# Patient Record
Sex: FEMALE | Race: WHITE | Employment: OTHER | URBAN - METROPOLITAN AREA
[De-identification: names, ages, dates, MRNs, and addresses within clinical notes are randomized per-mention and may not be internally consistent; named-entity substitution may affect disease eponyms.]

---

## 2017-12-18 PROBLEM — R00.2 PALPITATIONS: Status: ACTIVE | Noted: 2017-12-18

## 2017-12-18 PROBLEM — Z92.89 HISTORY OF ECHOCARDIOGRAM: Status: ACTIVE | Noted: 2017-12-18

## 2019-02-12 ENCOUNTER — OFFICE VISIT (OUTPATIENT)
Dept: CARDIOLOGY CLINIC | Age: 64
End: 2019-02-12
Payer: COMMERCIAL

## 2019-02-12 VITALS
WEIGHT: 191 LBS | BODY MASS INDEX: 36.06 KG/M2 | DIASTOLIC BLOOD PRESSURE: 72 MMHG | SYSTOLIC BLOOD PRESSURE: 124 MMHG | HEART RATE: 56 BPM | RESPIRATION RATE: 16 BRPM | HEIGHT: 61 IN

## 2019-02-12 DIAGNOSIS — R00.2 PALPITATIONS: Primary | ICD-10-CM

## 2019-02-12 PROCEDURE — 93000 ELECTROCARDIOGRAM COMPLETE: CPT | Performed by: INTERNAL MEDICINE

## 2019-02-12 PROCEDURE — 99213 OFFICE O/P EST LOW 20 MIN: CPT | Performed by: INTERNAL MEDICINE

## 2021-09-21 PROBLEM — G47.30 SLEEP APNEA: Status: ACTIVE | Noted: 2018-04-17

## 2021-09-21 PROBLEM — G43.909 MIGRAINE: Status: ACTIVE | Noted: 2021-09-21

## 2021-09-21 PROBLEM — K21.9 GASTROESOPHAGEAL REFLUX DISEASE: Status: ACTIVE | Noted: 2021-09-21

## 2022-10-01 ENCOUNTER — HOSPITAL ENCOUNTER (EMERGENCY)
Age: 67
Discharge: HOME | End: 2022-10-01
Attending: EMERGENCY MEDICINE
Payer: MEDICARE

## 2022-10-01 ENCOUNTER — APPOINTMENT (OUTPATIENT)
Dept: GENERAL RADIOLOGY | Age: 67
End: 2022-10-01
Payer: MEDICARE

## 2022-10-01 ENCOUNTER — APPOINTMENT (OUTPATIENT)
Dept: CT IMAGING | Age: 67
End: 2022-10-01
Payer: MEDICARE

## 2022-10-01 VITALS
SYSTOLIC BLOOD PRESSURE: 122 MMHG | RESPIRATION RATE: 14 BRPM | BODY MASS INDEX: 41.43 KG/M2 | HEART RATE: 68 BPM | WEIGHT: 211 LBS | TEMPERATURE: 96.3 F | DIASTOLIC BLOOD PRESSURE: 61 MMHG | OXYGEN SATURATION: 100 % | HEIGHT: 60 IN

## 2022-10-01 DIAGNOSIS — S52.501A CLOSED FRACTURE OF DISTAL END OF RIGHT RADIUS, UNSPECIFIED FRACTURE MORPHOLOGY, INITIAL ENCOUNTER: Primary | ICD-10-CM

## 2022-10-01 PROCEDURE — 99284 EMERGENCY DEPT VISIT MOD MDM: CPT

## 2022-10-01 PROCEDURE — 72125 CT NECK SPINE W/O DYE: CPT

## 2022-10-01 PROCEDURE — 90714 TD VACC NO PRESV 7 YRS+ IM: CPT | Performed by: STUDENT IN AN ORGANIZED HEALTH CARE EDUCATION/TRAINING PROGRAM

## 2022-10-01 PROCEDURE — 6360000002 HC RX W HCPCS: Performed by: STUDENT IN AN ORGANIZED HEALTH CARE EDUCATION/TRAINING PROGRAM

## 2022-10-01 PROCEDURE — 73110 X-RAY EXAM OF WRIST: CPT

## 2022-10-01 PROCEDURE — 90471 IMMUNIZATION ADMIN: CPT | Performed by: STUDENT IN AN ORGANIZED HEALTH CARE EDUCATION/TRAINING PROGRAM

## 2022-10-01 PROCEDURE — 25605 CLTX DST RDL FX/EPHYS SEP W/: CPT

## 2022-10-01 PROCEDURE — 96372 THER/PROPH/DIAG INJ SC/IM: CPT

## 2022-10-01 PROCEDURE — 73562 X-RAY EXAM OF KNEE 3: CPT

## 2022-10-01 PROCEDURE — 70450 CT HEAD/BRAIN W/O DYE: CPT

## 2022-10-01 PROCEDURE — 73090 X-RAY EXAM OF FOREARM: CPT

## 2022-10-01 RX ORDER — LIDOCAINE HYDROCHLORIDE 10 MG/ML
5 INJECTION, SOLUTION INFILTRATION; PERINEURAL ONCE
Status: DISCONTINUED | OUTPATIENT
Start: 2022-10-01 | End: 2022-10-01 | Stop reason: HOSPADM

## 2022-10-01 RX ORDER — MORPHINE SULFATE 4 MG/ML
4 INJECTION, SOLUTION INTRAMUSCULAR; INTRAVENOUS ONCE
Status: COMPLETED | OUTPATIENT
Start: 2022-10-01 | End: 2022-10-01

## 2022-10-01 RX ORDER — TETANUS AND DIPHTHERIA TOXOIDS ADSORBED 2; 2 [LF]/.5ML; [LF]/.5ML
0.5 INJECTION INTRAMUSCULAR ONCE
Status: COMPLETED | OUTPATIENT
Start: 2022-10-01 | End: 2022-10-01

## 2022-10-01 RX ORDER — HYDROCODONE BITARTRATE AND ACETAMINOPHEN 5; 325 MG/1; MG/1
1 TABLET ORAL EVERY 6 HOURS PRN
Qty: 12 TABLET | Refills: 0 | Status: SHIPPED | OUTPATIENT
Start: 2022-10-01 | End: 2022-10-04

## 2022-10-01 RX ADMIN — TETANUS AND DIPHTHERIA TOXOIDS ADSORBED 0.5 ML: 2; 2 INJECTION INTRAMUSCULAR at 09:02

## 2022-10-01 RX ADMIN — MORPHINE SULFATE 4 MG: 4 INJECTION, SOLUTION INTRAMUSCULAR; INTRAVENOUS at 09:05

## 2022-10-01 ASSESSMENT — ENCOUNTER SYMPTOMS
SORE THROAT: 0
NAUSEA: 0
ABDOMINAL PAIN: 0
BACK PAIN: 0
CHEST TIGHTNESS: 0
COUGH: 0
VOMITING: 0
SHORTNESS OF BREATH: 0
DIARRHEA: 0
ABDOMINAL DISTENTION: 0

## 2022-10-01 ASSESSMENT — PAIN DESCRIPTION - ORIENTATION: ORIENTATION: RIGHT

## 2022-10-01 ASSESSMENT — PAIN DESCRIPTION - DESCRIPTORS: DESCRIPTORS: THROBBING

## 2022-10-01 ASSESSMENT — PAIN DESCRIPTION - LOCATION: LOCATION: ARM

## 2022-10-01 ASSESSMENT — PAIN SCALES - GENERAL: PAINLEVEL_OUTOF10: 8

## 2022-10-01 NOTE — ED PROVIDER NOTES
HPI     Patient is a 74 y.o. female presents with a chief complaint of fall  This has been occurring for a few horus. Patient states that it gets better with nothing. Patient states that it gets worse with movement. Patient states that it is moderate in severity. Patient states it was acute in onset. Patient stated that she was walking down the stairs and had a mechanical fall. Patient stated that she was going down steps and slipped. Patient landed on her right hand. Patient stated that she may have hit her head. States that she does not feel she lost consciousness. No chest pain or shortness of breath prior to the fall. Patient denies any other injuries but states that her knee hurts. Review of Systems   Constitutional:  Negative for activity change, appetite change, chills, fatigue and fever. HENT:  Negative for congestion, drooling and sore throat. Respiratory:  Negative for cough, chest tightness and shortness of breath. Cardiovascular:  Negative for chest pain and palpitations. Gastrointestinal:  Negative for abdominal distention, abdominal pain, diarrhea, nausea and vomiting. Genitourinary:  Negative for decreased urine volume, difficulty urinating, enuresis, flank pain, frequency and hematuria. Musculoskeletal:  Positive for joint swelling. Negative for arthralgias, back pain and neck stiffness. Skin:  Positive for wound. Negative for rash. Neurological:  Negative for dizziness, facial asymmetry, light-headedness and headaches. Psychiatric/Behavioral:  Negative for agitation, confusion and decreased concentration. Physical Exam  Vitals and nursing note reviewed. Constitutional:       Appearance: She is well-developed. HENT:      Head: Normocephalic and atraumatic. Eyes:      Conjunctiva/sclera: Conjunctivae normal.   Cardiovascular:      Rate and Rhythm: Normal rate and regular rhythm. Heart sounds: Normal heart sounds. No murmur heard.   Pulmonary: Effort: Pulmonary effort is normal. No respiratory distress. Breath sounds: Normal breath sounds. No wheezing or rales. Abdominal:      General: Bowel sounds are normal.      Palpations: Abdomen is soft. Tenderness: There is no abdominal tenderness. There is no guarding or rebound. Musculoskeletal:         General: Tenderness and deformity present. Cervical back: Normal range of motion and neck supple. Comments: Pulse intact neurovascularly intact in the right hand. Small abrasion over the left elbow. Good pulses. Good cap refill. Skin:     General: Skin is warm and dry. Neurological:      Mental Status: She is alert and oriented to person, place, and time. Cranial Nerves: No cranial nerve deficit. Coordination: Coordination normal.        Ortho Injury    Date/Time: 10/1/2022 11:20 AM  Performed by: Edyta Stevens MD  Authorized by: Angeline Lisa DO   Consent: Verbal consent obtained.   Consent given by: patient  Patient understanding: patient states understanding of the procedure being performed  Patient consent: the patient's understanding of the procedure matches consent given  Procedure consent: procedure consent matches procedure scheduled  Relevant documents: relevant documents present and verified  Test results: test results available and properly labeled  Imaging studies: imaging studies available  Patient identity confirmed: arm band, provided demographic data and verbally with patient  Injury location: wrist  Location details: right wrist  Injury type: fracture  Fracture type: distal radius  Pre-procedure distal perfusion: diminished  Pre-procedure neurological function: normal    Anesthesia:  Local anesthesia used: yes  Anesthetic total: 15 mL    Sedation:  Patient sedated: no    Immobilization: splint  Post-procedure neurovascular assessment: post-procedure neurovascularly intact  Post-procedure distal perfusion: normal  Post-procedure neurological function: normal  Post-procedure range of motion: improved         MDM           Patient is a 74 y.o. female presenting with fall. Patient landed on her outstretched wrist.  Patient had no chest pain or shortness of breath. Not felt to be cardiac related. Patient had a CT of the head and CT of the C-spine that was negative. Patient had a distal radius fracture. Patient had a hematoma block. Patient had wrist reduced. Post reduction films were obtained. Patient was educated on splint care. Patient will be educated follow-up with orthopedic surgery. Patient be discharged home with pain medicine. Patient was neurovascular intact. Patient was given return precautions. Labs were interpreted by me. Patient will follow up with their primary care provider. Patient is agreeable to this plan. Patient has remained stable throughout their stay in the ED. Patient was seen and evaluated by myself and my attending Kendrick Oglesby DO. Assessment and Plan discussed with attending provider, please see attestation for final plan of care. This note was done using dictation software and there may be some grammatical errors associated with this. Jevon Keene MD              --------------------------------------------- PAST HISTORY ---------------------------------------------  Past Medical History:  has a past medical history of Depression, Erythema, and Kidney stones. Past Surgical History:  has a past surgical history that includes Gallbladder surgery. Social History:  reports that she has never smoked. She has never used smokeless tobacco. She reports that she does not drink alcohol and does not use drugs. Family History: family history is not on file. The patients home medications have been reviewed.     Allergies: Tetracyclines & related and Penicillins    -------------------------------------------------- RESULTS -------------------------------------------------  Labs:  No results found for this visit on 10/01/22. Radiology:  CT Head WO Contrast   Final Result   No acute intracranial abnormality. Specifically, there is no acute   intracranial hemorrhage         CT Cervical Spine WO Contrast   Final Result   1. There is no acute compression fracture or subluxation of the cervical   spine. 2. Advanced multilevel degenerative disc and degenerative joint disease. .         XR WRIST RIGHT (MIN 3 VIEWS)   Final Result   Acute distal radius and ulna fractures. XR RADIUS ULNA RIGHT (2 VIEWS)   Final Result   Acute distal radius and ulna fractures. XR KNEE RIGHT (3 VIEWS)   Final Result   No acute abnormality of the knee. XR WRIST RIGHT (MIN 3 VIEWS)    (Results Pending)       ------------------------- NURSING NOTES AND VITALS REVIEWED ---------------------------  Date / Time Roomed:  10/1/2022  7:37 AM  ED Bed Assignment:  24/24    The nursing notes within the ED encounter and vital signs as below have been reviewed. /61   Pulse 68   Temp (!) 96.3 °F (35.7 °C) (Temporal)   Resp 14   Ht 5' (1.524 m)   Wt 211 lb (95.7 kg)   SpO2 100%   BMI 41.21 kg/m²   Oxygen Saturation Interpretation: Normal      ------------------------------------------ PROGRESS NOTES ------------------------------------------  11:22 AM EDT  I have spoken with the patient and family if present and discussed todays results, in addition to providing specific details for the plan of care and counseling regarding the diagnosis and prognosis. Their questions are answered at this time and they are agreeable with the plan. I discussed at length with them reasons for immediate return here for re evaluation.  They will followup with their primary care provider and orthopedics by calling their office as soon as possible.      --------------------------------- ADDITIONAL PROVIDER NOTES ---------------------------------  At this time the patient is without objective evidence of an acute process requiring hospitalization or inpatient management. They have remained hemodynamically stable throughout their entire ED visit and are stable for discharge with outpatient follow-up. The plan has been discussed in detail and they are aware of the specific conditions for emergent return, as well as the importance of follow-up. New Prescriptions    HYDROCODONE-ACETAMINOPHEN (NORCO) 5-325 MG PER TABLET    Take 1 tablet by mouth every 6 hours as needed for Pain for up to 3 days. Intended supply: 3 days. Take lowest dose possible to manage pain       Diagnosis:  1. Closed fracture of distal end of right radius, unspecified fracture morphology, initial encounter        Disposition:  Patient's disposition: Discharge to home  Patient's condition is stable.        Jevon Keene MD  Resident  10/01/22 [id number]

## 2022-10-03 ENCOUNTER — TELEPHONE (OUTPATIENT)
Dept: ORTHOPEDIC SURGERY | Age: 67
End: 2022-10-03

## 2022-10-03 NOTE — TELEPHONE ENCOUNTER
10/03/2022 Phoned and spoke w/ the patient.  Appointment made for Wednesday, October 5 th at 3:30 pm [facility] visit for Right Distal Radius Fracture, schedule surgery

## 2022-10-03 NOTE — TELEPHONE ENCOUNTER
----- Message from Keagan George DO sent at 10/2/2022  9:12 AM EDT -----  Need to see her early this week to schedule surgery for next week  ----- Message -----  From: Leonor Hoffman DO  Sent: 10/1/2022  11:32 AM EDT  To: Keagan George DO

## 2022-10-05 ENCOUNTER — OFFICE VISIT (OUTPATIENT)
Dept: ORTHOPEDIC SURGERY | Age: 67
End: 2022-10-05
Payer: MEDICARE

## 2022-10-05 VITALS — WEIGHT: 210 LBS | HEIGHT: 60 IN | BODY MASS INDEX: 41.23 KG/M2

## 2022-10-05 DIAGNOSIS — S52.531A CLOSED COLLES' FRACTURE OF RIGHT RADIUS, INITIAL ENCOUNTER: Primary | ICD-10-CM

## 2022-10-05 PROCEDURE — 1123F ACP DISCUSS/DSCN MKR DOCD: CPT | Performed by: STUDENT IN AN ORGANIZED HEALTH CARE EDUCATION/TRAINING PROGRAM

## 2022-10-05 PROCEDURE — 99205 OFFICE O/P NEW HI 60 MIN: CPT | Performed by: STUDENT IN AN ORGANIZED HEALTH CARE EDUCATION/TRAINING PROGRAM

## 2022-10-05 RX ORDER — IBUPROFEN 200 MG
400 TABLET ORAL EVERY 6 HOURS PRN
COMMUNITY

## 2022-10-05 RX ORDER — ROSUVASTATIN CALCIUM 5 MG/1
5 TABLET, COATED ORAL DAILY
COMMUNITY
Start: 2022-08-31

## 2022-10-05 RX ORDER — DEXTROAMPHETAMINE SACCHARATE, AMPHETAMINE ASPARTATE MONOHYDRATE, DEXTROAMPHETAMINE SULFATE AND AMPHETAMINE SULFATE 5; 5; 5; 5 MG/1; MG/1; MG/1; MG/1
1 CAPSULE, EXTENDED RELEASE ORAL DAILY
COMMUNITY

## 2022-10-05 RX ORDER — HYDROCODONE BITARTRATE AND ACETAMINOPHEN 5; 325 MG/1; MG/1
1 TABLET ORAL EVERY 6 HOURS PRN
Status: ON HOLD | COMMUNITY
End: 2022-10-11 | Stop reason: HOSPADM

## 2022-10-05 RX ORDER — LORATADINE 10 MG/1
1 TABLET ORAL DAILY PRN
COMMUNITY
Start: 2022-07-29

## 2022-10-05 NOTE — PROGRESS NOTES
New Wrist/Hand Patient Visit     Referring Provider:   No referring provider defined for this encounter. CHIEF COMPLAINT:   Chief Complaint   Patient presents with    Wrist Injury     Fracture right distal radius. DOI 10/3/2022. Right knee gave out as she was going up steps, she fell down 4 steps, landing on right wrist.  X-rays in epic, splinted. Right hand dominant. HPI:      Hanna Garvey is a 74y.o. year old female who is right-hand dominant. She sustained injury to her right wrist falling on the stairs over the weekend. Her knee gave out when she was going up and on her wrist.  She had a closed reduction of her right displaced distal radius fracture in the emergency department. She has no other injuries. Denies numbness tingling. Denies fevers or chills. PAST MEDICAL HISTORY  Past Medical History:   Diagnosis Date    Depression     Diabetes mellitus ([address]     Erythema     History of seasonal allergies     Kidney stones     PONV (postoperative nausea and vomiting)     Sleep apnea        PAST SURGICAL HISTORY  Past Surgical History:   Procedure Laterality Date    COLONOSCOPY      EXTRACORPOREAL SHOCK WAVE LITHOTRIPSY      GALLBLADDER SURGERY      TONSILLECTOMY         FAMILY HISTORY   History reviewed. No pertinent family history.     SOCIAL HISTORY  Social History     Occupational History    Not on file   Tobacco Use    Smoking status: Never    Smokeless tobacco: Never   Vaping Use    Vaping Use: Never used   Substance and Sexual Activity    Alcohol use: No    Drug use: No    Sexual activity: Not Currently     Partners: Male       CURRENT MEDICATIONS     Current Outpatient Medications:     HYDROcodone-acetaminophen (NORCO) 5-325 MG per tablet, Take 1 tablet by mouth every 6 hours as needed for Pain., Disp: , Rfl:     ibuprofen (ADVIL;MOTRIN) 200 MG tablet, Take 400 mg by mouth every 6 hours as needed for Pain (in between norco), Disp: , Rfl:     amphetamine-dextroamphetamine (ADDERALL XR) 20 MG extended release capsule, Take 1 capsule by mouth daily. , Disp: , Rfl:     loratadine (CLARITIN) 10 MG tablet, Take 1 tablet by mouth daily as needed, Disp: , Rfl:     metFORMIN (GLUCOPHAGE) 500 MG tablet, Take 1 tablet by mouth in the morning and at bedtime, Disp: , Rfl:     Cholecalciferol (VITAMIN D3) 25 MCG (1000 UT) TABS, Take 1,000 Units by mouth daily, Disp: , Rfl:     NONFORMULARY, 1,000 mcg daily, Disp: , Rfl:     buPROPion (WELLBUTRIN XL) 300 MG extended release tablet, TAKE 1 TABLET DAILY AS DIRECTED., Disp: , Rfl: 1    clonazePAM (KLONOPIN) 1 MG tablet, Takes 1 tablet at HS, Disp: , Rfl: 3    escitalopram (LEXAPRO) 20 MG tablet, TAKE 1 TABLET BY MOUTH EVERY DAY, Disp: , Rfl: 1    metoprolol succinate (TOPROL XL) 25 MG extended release tablet, Take 12.5 mg by mouth 2 times daily, Disp: , Rfl: 3    omeprazole (PRILOSEC) 20 MG delayed release capsule, TAKE ONE CAPSULE BY MOUTH EVERY MORNING, Disp: , Rfl: 1    rosuvastatin (CRESTOR) 5 MG tablet, Take 5 mg by mouth daily (Patient not taking: Reported on 10/5/2022), Disp: , Rfl:     ALLERGIES  Allergies   Allergen Reactions    Tetracyclines & Related Itching    Penicillins      Other reaction(s): Facial Swelling       Controlled Substances Monitoring:        REVIEW OF SYSTEMS:     Constitutional:  Negative for weight loss, fevers, chills, fatigue  Cardiovascular: Negative for chest pain, palpitations  Pulmonary: Negative for shortness of breath, labored breathing, cough  GI: negative for abdominal pain, nausea, vomitting   MSK: per HPI  Skin: negative for rash, open wounds    All other systems reviewed and are negative           PHYSICAL EXAM     Vitals:    10/05/22 1515   Weight: 210 lb (95.3 kg)   Height: 5' (1.524 m)       Height: 5' (1.524 m)  Weight: [unfilled]  BMI:  Body mass index is 41.01 kg/m². General: The patient is alert and oriented x 3, appears to be stated age and in no distress. HEENT: head is normocephalic, atraumatic. EOMI. Neck: supple, trachea midline, no thyromegaly   Cardiovascular: peripheral pulses palpable. Normal Capillary refill   Respiratory: breathing unlabored, chest expansion symmetric   Skin: no rash, no open wounds, no erythema  Psych: normal affect; mood stable  Neurologic: gait normal, sensation grossly intact in extremities  MSK:      Hand/Wrist:  Well-padded sugar-tong splint in place. Fingers are warm and well-perfused. Can wiggle wiggle fingers. Brisk capillary refill. Nontender along her shoulder. IMAGING:     XRAY: Of the wrist reviewed from the hospital revealed intra-articular displaced dorsally distal radius fracture on the right    Radiographic findings reviewed with patient    ASSESSMENT  Right displaced intra-articular distal radius fracture      PLAN  -Treatment plan discussed in detail with the patient. Due to the nature of this injury she has multiple risk factors for failed closed treatment. For this reason we recommend open reduction internal fixation with plate and screws. Risk benefits alternatives of surgery discussed detail she wished to proceed. We discussed the risk of tendon rupture with surgery along with damage to surrounding structures then chance of nonunion. She is also going to need osteoporosis work-up by her family physician moving forward. I recommend a DEXA scan once his surgery is over. She is at high risk for future fragility fractures. Surgery scheduled for next Tuesday.   This will be an outpatient surgery          Tyron Onofre DO  Orthopaedic Surgery   10/5/22   3:54 PM EDT

## 2022-10-05 NOTE — PROGRESS NOTES
[address]   PRE-ADMISSION TESTING GENERAL INSTRUCTIONS  PAT Phone Number: [phone]      GENERAL INSTRUCTIONS:    [x] Antibacterial Soap Shower Night before and/or AM of surgery. [] CHG Wipes instruction sheet and wipes given. [] Hibiclens shower the night before and the morning of surgery.   -Do not use Hibiclens on your face or head. [x] Do not wear makeup, lotions, powders, deodorant. [x] Nothing by mouth after midnight; including gum, candy, mints, or water. [x] You may brush your teeth, gargle, but do NOT swallow water. [] No tobacco products, illegal drugs, or alcohol within 24 hours of your surgery. [x] Jewelry or valuables should not be brought to the hospital. All body and/or tongue piercing's must be removed prior to arriving to hospital. No contact lens or hair pins. [x] Arrange transportation with a responsible adult  to and from the hospital. Arrange for someone to be with you for the remainder of the day and for 24 hours after your procedure due to having had anesthesia. -Who will be your  for transportation?__husband________________         -Who will be staying with you for 24 hrs after your procedure? _husband_________________  [x] Bring insurance card and photo ID. [x] Bring copy of living will or healthcare power of  papers to be placed in your electronic record. [] Urine Pregnancy test will be preformed the day of surgery. A specimen sample may be brought from home. [] Transfusion Bracelet: Please bring with you to hospital, day of surgery. PARKING INSTRUCTIONS:     [x] ARRIVAL DATE & TIME: Oct 11 @ 0600  [x] Enter into the [facility]. Two people may accompany you. Masks are not required but are recommended. [x] Parking Lot \"I\" is where you will park. It is located on the corner of [facility] and [facility]. The entrance is on [facility].    Upon entering the parking lot, a voucher ticket will print    EDUCATION INSTRUCTIONS:        [] Knee or Hip replacement booklet & exercise pamphlets given. [] [address] placed in chart. [] Pre-admission Testing educational folder given  [] Incentive Spirometry,coughing & deep breathing exercises reviewed. [] Medication information sheet(s)   [x] Fluoroscopy-Xray used in surgery reviewed with patient. Educational pamphlet placed in chart. [x] Pain: Post-op pain is normal and to be expected. You will be asked to rate your pain from 0-10. [] Joint camp offered. [] Joint replacement booklets given.  [] Spine Navigator to see in PAT. [] Other:___________________________    MEDICATION INSTRUCTIONS:    [x] Bring a complete list of your medications, please write the last time you took the medicine, give this list to the nurse in Pre-Op. [x] Take only the following medications the morning of surgery with 1-2 ounces of water: see list  [x] Stop all herbal supplements and vitamins 5 days before surgery. Stop NSAIDS 7 days before surgery. [x] DO NOT take any diabetic medicine the morning of surgery. Follow instructions for insulin the day before surgery. [x] If you are diabetic and your blood sugar is low or you feel symptomatic, you may drink 1-2 ounces of apple juice or take a glucose tablet.            -The morning of your procedure, you may call the pre-op area if you have concerns about your blood sugar [phone]. [] Use your inhalers the morning of surgery. Bring your emergency inhaler with you day of surgery. [x] Follow physician instructions regarding any blood thinners you may be taking. Hold advil. WHAT TO EXPECT:    [x] The day of surgery you will be greeted and checked in by the [employer].  In addition, you will be registered in the [place] by a Patient Access Representative. Please bring your photo ID and insurance card.  A nurse will greet you in accordance to the time you are needed in the pre-op area to prepare you for surgery. Please do not be discouraged if you are not greeted in the order you arrive as there are many variables that are involved in patient preparation. Your patience is greatly appreciated as you wait for your nurse. Please bring in items such as: books, magazines, newspapers, electronics, or any other items  to occupy your time in the waiting area. [x]  Delays may occur with surgery and staff will make a sincere effort to keep you informed of delays. If any delays occur with your procedure, we apologize ahead of time for your inconvenience as we recognize the value of your time.

## 2022-10-05 NOTE — PROGRESS NOTES
Patient needs EKG for her surgery 10/11. I spoke to her spouse, she just fell asleep. States she is having a lot of pain. He will have her call us when she awakens.

## 2022-10-10 ENCOUNTER — ANESTHESIA EVENT (OUTPATIENT)
Dept: OPERATING ROOM | Age: 67
End: 2022-10-10
Payer: MEDICARE

## 2022-10-11 ENCOUNTER — ANESTHESIA (OUTPATIENT)
Dept: OPERATING ROOM | Age: 67
End: 2022-10-11
Payer: MEDICARE

## 2022-10-11 ENCOUNTER — APPOINTMENT (OUTPATIENT)
Dept: GENERAL RADIOLOGY | Age: 67
End: 2022-10-11
Attending: STUDENT IN AN ORGANIZED HEALTH CARE EDUCATION/TRAINING PROGRAM
Payer: MEDICARE

## 2022-10-11 ENCOUNTER — HOSPITAL ENCOUNTER (OUTPATIENT)
Age: 67
Setting detail: OUTPATIENT SURGERY
Discharge: HOSPICE | End: 2022-10-11
Attending: STUDENT IN AN ORGANIZED HEALTH CARE EDUCATION/TRAINING PROGRAM | Admitting: STUDENT IN AN ORGANIZED HEALTH CARE EDUCATION/TRAINING PROGRAM
Payer: MEDICARE

## 2022-10-11 VITALS
HEIGHT: 60 IN | RESPIRATION RATE: 21 BRPM | TEMPERATURE: 97.5 F | OXYGEN SATURATION: 93 % | DIASTOLIC BLOOD PRESSURE: 68 MMHG | WEIGHT: 211 LBS | BODY MASS INDEX: 41.43 KG/M2 | HEART RATE: 80 BPM | SYSTOLIC BLOOD PRESSURE: 118 MMHG

## 2022-10-11 DIAGNOSIS — Z01.812 PRE-OPERATIVE LABORATORY EXAMINATION: Primary | ICD-10-CM

## 2022-10-11 DIAGNOSIS — S52.531A CLOSED COLLES' FRACTURE OF RIGHT RADIUS, INITIAL ENCOUNTER: ICD-10-CM

## 2022-10-11 LAB
ANION GAP SERPL CALCULATED.3IONS-SCNC: 12 MMOL/L (ref 7–16)
BUN BLDV-MCNC: 14 MG/DL (ref 6–23)
CALCIUM SERPL-MCNC: 9.7 MG/DL (ref 8.6–10.2)
CHLORIDE BLD-SCNC: 103 MMOL/L (ref 98–107)
CO2: 22 MMOL/L (ref 22–29)
CREAT SERPL-MCNC: 1.1 MG/DL (ref 0.5–1)
GFR AFRICAN AMERICAN: >60
GFR NON-AFRICAN AMERICAN: 50 ML/MIN/1.73
GLUCOSE BLD-MCNC: 109 MG/DL (ref 74–99)
HCT VFR BLD CALC: 38.5 % (ref 34–48)
HEMOGLOBIN: 12.2 G/DL (ref 11.5–15.5)
MCH RBC QN AUTO: 26.2 PG (ref 26–35)
MCHC RBC AUTO-ENTMCNC: 31.7 % (ref 32–34.5)
MCV RBC AUTO: 82.6 FL (ref 80–99.9)
METER GLUCOSE: 107 MG/DL (ref 74–99)
PDW BLD-RTO: 15.9 FL (ref 11.5–15)
PLATELET # BLD: 277 E9/L (ref 130–450)
PMV BLD AUTO: 10.6 FL (ref 7–12)
POTASSIUM SERPL-SCNC: 4.7 MMOL/L (ref 3.5–5)
RBC # BLD: 4.66 E12/L (ref 3.5–5.5)
SODIUM BLD-SCNC: 137 MMOL/L (ref 132–146)
WBC # BLD: 8 E9/L (ref 4.5–11.5)

## 2022-10-11 PROCEDURE — 7100000001 HC PACU RECOVERY - ADDTL 15 MIN: Performed by: STUDENT IN AN ORGANIZED HEALTH CARE EDUCATION/TRAINING PROGRAM

## 2022-10-11 PROCEDURE — 6360000002 HC RX W HCPCS

## 2022-10-11 PROCEDURE — 3600000015 HC SURGERY LEVEL 5 ADDTL 15MIN: Performed by: STUDENT IN AN ORGANIZED HEALTH CARE EDUCATION/TRAINING PROGRAM

## 2022-10-11 PROCEDURE — 7100000010 HC PHASE II RECOVERY - FIRST 15 MIN: Performed by: STUDENT IN AN ORGANIZED HEALTH CARE EDUCATION/TRAINING PROGRAM

## 2022-10-11 PROCEDURE — 82962 GLUCOSE BLOOD TEST: CPT

## 2022-10-11 PROCEDURE — 2720000010 HC SURG SUPPLY STERILE: Performed by: STUDENT IN AN ORGANIZED HEALTH CARE EDUCATION/TRAINING PROGRAM

## 2022-10-11 PROCEDURE — 7100000000 HC PACU RECOVERY - FIRST 15 MIN: Performed by: STUDENT IN AN ORGANIZED HEALTH CARE EDUCATION/TRAINING PROGRAM

## 2022-10-11 PROCEDURE — 6370000000 HC RX 637 (ALT 250 FOR IP): Performed by: STUDENT IN AN ORGANIZED HEALTH CARE EDUCATION/TRAINING PROGRAM

## 2022-10-11 PROCEDURE — 2580000003 HC RX 258: Performed by: STUDENT IN AN ORGANIZED HEALTH CARE EDUCATION/TRAINING PROGRAM

## 2022-10-11 PROCEDURE — 7100000011 HC PHASE II RECOVERY - ADDTL 15 MIN: Performed by: STUDENT IN AN ORGANIZED HEALTH CARE EDUCATION/TRAINING PROGRAM

## 2022-10-11 PROCEDURE — 2580000003 HC RX 258

## 2022-10-11 PROCEDURE — 3700000000 HC ANESTHESIA ATTENDED CARE: Performed by: STUDENT IN AN ORGANIZED HEALTH CARE EDUCATION/TRAINING PROGRAM

## 2022-10-11 PROCEDURE — 94660 CPAP INITIATION&MGMT: CPT

## 2022-10-11 PROCEDURE — 6360000002 HC RX W HCPCS: Performed by: ANESTHESIOLOGY

## 2022-10-11 PROCEDURE — 64417 NJX AA&/STRD AX NERVE IMG: CPT | Performed by: ANESTHESIOLOGY

## 2022-10-11 PROCEDURE — 36415 COLL VENOUS BLD VENIPUNCTURE: CPT

## 2022-10-11 PROCEDURE — 85027 COMPLETE CBC AUTOMATED: CPT

## 2022-10-11 PROCEDURE — 3700000001 HC ADD 15 MINUTES (ANESTHESIA): Performed by: STUDENT IN AN ORGANIZED HEALTH CARE EDUCATION/TRAINING PROGRAM

## 2022-10-11 PROCEDURE — 3600000005 HC SURGERY LEVEL 5 BASE: Performed by: STUDENT IN AN ORGANIZED HEALTH CARE EDUCATION/TRAINING PROGRAM

## 2022-10-11 PROCEDURE — 3209999900 FLUORO FOR SURGICAL PROCEDURES

## 2022-10-11 PROCEDURE — 80048 BASIC METABOLIC PNL TOTAL CA: CPT

## 2022-10-11 PROCEDURE — 2500000003 HC RX 250 WO HCPCS

## 2022-10-11 PROCEDURE — 2709999900 HC NON-CHARGEABLE SUPPLY: Performed by: STUDENT IN AN ORGANIZED HEALTH CARE EDUCATION/TRAINING PROGRAM

## 2022-10-11 PROCEDURE — C1713 ANCHOR/SCREW BN/BN,TIS/BN: HCPCS | Performed by: STUDENT IN AN ORGANIZED HEALTH CARE EDUCATION/TRAINING PROGRAM

## 2022-10-11 DEVICE — PLATE BNE W22XL54MM STD 6X3 H ST R DST RAD VOLAR S STL VAR: Type: IMPLANTABLE DEVICE | Status: FUNCTIONAL

## 2022-10-11 DEVICE — SCREW BNE L16MM DIA2.4MM DST RAD VOLAR S STL ST VAR ANG LOK: Type: IMPLANTABLE DEVICE | Site: ARM | Status: FUNCTIONAL

## 2022-10-11 DEVICE — SCREW BNE L14MM DIA2.7MM CORT S STL ST T8 STARDRV RECESS: Type: IMPLANTABLE DEVICE | Site: ARM | Status: FUNCTIONAL

## 2022-10-11 DEVICE — SCREW BNE L12MM DIA2.7MM CORT S STL ST T8 STARDRV RECESS: Type: IMPLANTABLE DEVICE | Site: ARM | Status: FUNCTIONAL

## 2022-10-11 DEVICE — SCREW BNE L20MM DIA2.4MM DST RAD VOLAR S STL ST VAR ANG LOK: Type: IMPLANTABLE DEVICE | Site: ARM | Status: FUNCTIONAL

## 2022-10-11 RX ORDER — FENTANYL CITRATE 50 UG/ML
INJECTION, SOLUTION INTRAMUSCULAR; INTRAVENOUS PRN
Status: DISCONTINUED | OUTPATIENT
Start: 2022-10-11 | End: 2022-10-11 | Stop reason: SDUPTHER

## 2022-10-11 RX ORDER — DEXAMETHASONE SODIUM PHOSPHATE 10 MG/ML
INJECTION INTRAMUSCULAR; INTRAVENOUS PRN
Status: DISCONTINUED | OUTPATIENT
Start: 2022-10-11 | End: 2022-10-11 | Stop reason: SDUPTHER

## 2022-10-11 RX ORDER — SODIUM CHLORIDE 0.9 % (FLUSH) 0.9 %
5-40 SYRINGE (ML) INJECTION EVERY 12 HOURS SCHEDULED
Status: DISCONTINUED | OUTPATIENT
Start: 2022-10-11 | End: 2022-10-11 | Stop reason: HOSPADM

## 2022-10-11 RX ORDER — SODIUM CHLORIDE 9 MG/ML
INJECTION, SOLUTION INTRAVENOUS PRN
Status: DISCONTINUED | OUTPATIENT
Start: 2022-10-11 | End: 2022-10-11 | Stop reason: HOSPADM

## 2022-10-11 RX ORDER — EPHEDRINE SULFATE 50 MG/ML
INJECTION, SOLUTION INTRAVENOUS PRN
Status: DISCONTINUED | OUTPATIENT
Start: 2022-10-11 | End: 2022-10-11 | Stop reason: SDUPTHER

## 2022-10-11 RX ORDER — SODIUM CHLORIDE 9 MG/ML
INJECTION, SOLUTION INTRAVENOUS CONTINUOUS PRN
Status: DISCONTINUED | OUTPATIENT
Start: 2022-10-11 | End: 2022-10-11 | Stop reason: SDUPTHER

## 2022-10-11 RX ORDER — SODIUM CHLORIDE 9 MG/ML
INJECTION, SOLUTION INTRAVENOUS CONTINUOUS
Status: DISCONTINUED | OUTPATIENT
Start: 2022-10-11 | End: 2022-10-11 | Stop reason: HOSPADM

## 2022-10-11 RX ORDER — ROPIVACAINE HYDROCHLORIDE 5 MG/ML
40 INJECTION, SOLUTION EPIDURAL; INFILTRATION; PERINEURAL ONCE
Status: COMPLETED | OUTPATIENT
Start: 2022-10-11 | End: 2022-10-11

## 2022-10-11 RX ORDER — GLYCOPYRROLATE 0.2 MG/ML
INJECTION INTRAMUSCULAR; INTRAVENOUS PRN
Status: DISCONTINUED | OUTPATIENT
Start: 2022-10-11 | End: 2022-10-11 | Stop reason: SDUPTHER

## 2022-10-11 RX ORDER — DIAPER,BRIEF,INFANT-TODD,DISP
EACH MISCELLANEOUS PRN
Status: DISCONTINUED | OUTPATIENT
Start: 2022-10-11 | End: 2022-10-11 | Stop reason: ALTCHOICE

## 2022-10-11 RX ORDER — MIDAZOLAM HYDROCHLORIDE 1 MG/ML
INJECTION INTRAMUSCULAR; INTRAVENOUS
Status: DISCONTINUED
Start: 2022-10-11 | End: 2022-10-11 | Stop reason: HOSPADM

## 2022-10-11 RX ORDER — PROPOFOL 10 MG/ML
INJECTION, EMULSION INTRAVENOUS PRN
Status: DISCONTINUED | OUTPATIENT
Start: 2022-10-11 | End: 2022-10-11 | Stop reason: SDUPTHER

## 2022-10-11 RX ORDER — CEFAZOLIN SODIUM 1 G/3ML
INJECTION, POWDER, FOR SOLUTION INTRAMUSCULAR; INTRAVENOUS PRN
Status: DISCONTINUED | OUTPATIENT
Start: 2022-10-11 | End: 2022-10-11 | Stop reason: SDUPTHER

## 2022-10-11 RX ORDER — ROPIVACAINE HYDROCHLORIDE 5 MG/ML
INJECTION, SOLUTION EPIDURAL; INFILTRATION; PERINEURAL
Status: COMPLETED | OUTPATIENT
Start: 2022-10-11 | End: 2022-10-11

## 2022-10-11 RX ORDER — NEOSTIGMINE METHYLSULFATE 1 MG/ML
INJECTION, SOLUTION INTRAVENOUS PRN
Status: DISCONTINUED | OUTPATIENT
Start: 2022-10-11 | End: 2022-10-11 | Stop reason: SDUPTHER

## 2022-10-11 RX ORDER — MEPERIDINE HYDROCHLORIDE 25 MG/ML
12.5 INJECTION INTRAMUSCULAR; INTRAVENOUS; SUBCUTANEOUS
Status: DISCONTINUED | OUTPATIENT
Start: 2022-10-11 | End: 2022-10-11 | Stop reason: HOSPADM

## 2022-10-11 RX ORDER — ONDANSETRON 2 MG/ML
4 INJECTION INTRAMUSCULAR; INTRAVENOUS
Status: DISCONTINUED | OUTPATIENT
Start: 2022-10-11 | End: 2022-10-11 | Stop reason: HOSPADM

## 2022-10-11 RX ORDER — SODIUM CHLORIDE 0.9 % (FLUSH) 0.9 %
5-40 SYRINGE (ML) INJECTION PRN
Status: DISCONTINUED | OUTPATIENT
Start: 2022-10-11 | End: 2022-10-11 | Stop reason: HOSPADM

## 2022-10-11 RX ORDER — OXYCODONE HYDROCHLORIDE AND ACETAMINOPHEN 5; 325 MG/1; MG/1
1 TABLET ORAL EVERY 6 HOURS PRN
Qty: 28 TABLET | Refills: 0 | Status: SHIPPED | OUTPATIENT
Start: 2022-10-11 | End: 2022-10-18

## 2022-10-11 RX ORDER — ONDANSETRON 2 MG/ML
INJECTION INTRAMUSCULAR; INTRAVENOUS PRN
Status: DISCONTINUED | OUTPATIENT
Start: 2022-10-11 | End: 2022-10-11 | Stop reason: SDUPTHER

## 2022-10-11 RX ORDER — ROPIVACAINE HYDROCHLORIDE 5 MG/ML
INJECTION, SOLUTION EPIDURAL; INFILTRATION; PERINEURAL
Status: COMPLETED
Start: 2022-10-11 | End: 2022-10-11

## 2022-10-11 RX ORDER — LIDOCAINE HYDROCHLORIDE 20 MG/ML
INJECTION, SOLUTION INTRAVENOUS PRN
Status: DISCONTINUED | OUTPATIENT
Start: 2022-10-11 | End: 2022-10-11 | Stop reason: SDUPTHER

## 2022-10-11 RX ORDER — DEXAMETHASONE SODIUM PHOSPHATE 10 MG/ML
4 INJECTION, SOLUTION INTRAMUSCULAR; INTRAVENOUS ONCE
Status: DISCONTINUED | OUTPATIENT
Start: 2022-10-11 | End: 2022-10-11 | Stop reason: HOSPADM

## 2022-10-11 RX ORDER — MIDAZOLAM HYDROCHLORIDE 2 MG/2ML
1 INJECTION, SOLUTION INTRAMUSCULAR; INTRAVENOUS PRN
Status: DISCONTINUED | OUTPATIENT
Start: 2022-10-11 | End: 2022-10-11 | Stop reason: HOSPADM

## 2022-10-11 RX ORDER — ROCURONIUM BROMIDE 10 MG/ML
INJECTION, SOLUTION INTRAVENOUS PRN
Status: DISCONTINUED | OUTPATIENT
Start: 2022-10-11 | End: 2022-10-11 | Stop reason: SDUPTHER

## 2022-10-11 RX ADMIN — ROPIVACAINE HYDROCHLORIDE 40 ML: 5 INJECTION EPIDURAL; INFILTRATION; PERINEURAL at 07:36

## 2022-10-11 RX ADMIN — ROCURONIUM BROMIDE 50 MG: 10 INJECTION, SOLUTION INTRAVENOUS at 08:08

## 2022-10-11 RX ADMIN — SODIUM CHLORIDE: 9 INJECTION, SOLUTION INTRAVENOUS at 06:34

## 2022-10-11 RX ADMIN — ONDANSETRON 4 MG: 2 INJECTION INTRAMUSCULAR; INTRAVENOUS at 09:18

## 2022-10-11 RX ADMIN — FENTANYL CITRATE 100 MCG: 50 INJECTION, SOLUTION INTRAMUSCULAR; INTRAVENOUS at 08:59

## 2022-10-11 RX ADMIN — MIDAZOLAM 2 MG: 1 INJECTION INTRAMUSCULAR; INTRAVENOUS at 07:50

## 2022-10-11 RX ADMIN — DEXAMETHASONE SODIUM PHOSPHATE 10 MG: 10 INJECTION INTRAMUSCULAR; INTRAVENOUS at 08:08

## 2022-10-11 RX ADMIN — CEFAZOLIN 2 G: 1 INJECTION, POWDER, FOR SOLUTION INTRAMUSCULAR; INTRAVENOUS at 08:16

## 2022-10-11 RX ADMIN — ROPIVACAINE HYDROCHLORIDE 40 ML: 5 INJECTION, SOLUTION EPIDURAL; INFILTRATION; PERINEURAL at 07:30

## 2022-10-11 RX ADMIN — Medication 3 MG: at 09:18

## 2022-10-11 RX ADMIN — GLYCOPYRROLATE 0.2 MG: 0.2 INJECTION INTRAMUSCULAR; INTRAVENOUS at 08:35

## 2022-10-11 RX ADMIN — FENTANYL CITRATE 100 MCG: 50 INJECTION, SOLUTION INTRAMUSCULAR; INTRAVENOUS at 08:15

## 2022-10-11 RX ADMIN — GLYCOPYRROLATE 0.6 MG: 0.2 INJECTION INTRAMUSCULAR; INTRAVENOUS at 09:18

## 2022-10-11 RX ADMIN — EPHEDRINE SULFATE 20 MG: 50 INJECTION INTRAMUSCULAR; INTRAVENOUS; SUBCUTANEOUS at 08:41

## 2022-10-11 RX ADMIN — LIDOCAINE HYDROCHLORIDE 100 MG: 20 INJECTION, SOLUTION INTRAVENOUS at 08:08

## 2022-10-11 RX ADMIN — SODIUM CHLORIDE: 9 INJECTION, SOLUTION INTRAVENOUS at 08:00

## 2022-10-11 RX ADMIN — PROPOFOL 150 MG: 10 INJECTION, EMULSION INTRAVENOUS at 08:08

## 2022-10-11 ASSESSMENT — PAIN - FUNCTIONAL ASSESSMENT: PAIN_FUNCTIONAL_ASSESSMENT: 0-10

## 2022-10-11 ASSESSMENT — LIFESTYLE VARIABLES: SMOKING_STATUS: 0

## 2022-10-11 NOTE — ANESTHESIA PRE PROCEDURE
Department of Anesthesiology  Preprocedure Note       Name:  Rosalba Loyola   Age:  74 y.o.  :  1955                                          MRN:  [id number]         Date:  10/11/2022      Surgeon: Maria Guadalupe Costello):  Vicky Thompson DO    Procedure: Procedure(s):  RADIUS OPEN REDUCTION INTERNAL FIXATION, NEED SYNTHES    Medications prior to admission:   Prior to Admission medications    Medication Sig Start Date End Date Taking? Authorizing Provider   HYDROcodone-acetaminophen (NORCO) 5-325 MG per tablet Take 1 tablet by mouth every 6 hours as needed for Pain. Yes Historical Provider, MD   ibuprofen (ADVIL;MOTRIN) 200 MG tablet Take 400 mg by mouth every 6 hours as needed for Pain (in between norco)   Yes Historical Provider, MD   amphetamine-dextroamphetamine (ADDERALL XR) 20 MG extended release capsule Take 1 capsule by mouth daily.     Historical Provider, MD   loratadine (CLARITIN) 10 MG tablet Take 1 tablet by mouth daily as needed 22   Historical Provider, MD   metFORMIN (GLUCOPHAGE) 500 MG tablet Take 1 tablet by mouth in the morning and at bedtime 22   Historical Provider, MD   rosuvastatin (CRESTOR) 5 MG tablet Take 5 mg by mouth daily  Patient not taking: No sig reported 22   Historical Provider, MD   Cholecalciferol (VITAMIN D3) 25 MCG (1000 UT) TABS Take 1,000 Units by mouth daily    Historical Provider, MD   NONFORMULARY 1,000 mcg daily    Historical Provider, MD   buPROPion (WELLBUTRIN XL) 300 MG extended release tablet TAKE 1 TABLET DAILY AS DIRECTED. 10/25/17   Historical Provider, MD   clonazePAM (KLONOPIN) 1 MG tablet Takes 1 tablet at [facility] 17   Historical Provider, MD   escitalopram (LEXAPRO) 20 MG tablet TAKE 1 TABLET BY MOUTH EVERY DAY 10/25/17   Historical Provider, MD   metoprolol succinate (TOPROL XL) 25 MG extended release tablet Take 12.5 mg by mouth 2 times daily 17   Historical Provider, MD   omeprazole (PRILOSEC) 20 MG delayed release capsule TAKE ONE CAPSULE BY MOUTH EVERY MORNING 9/21/17   Historical Provider, MD       Current medications:    Current Facility-Administered Medications   Medication Dose Route Frequency Provider Last Rate Last Admin    0.9 % sodium chloride infusion   IntraVENous Continuous Argscooter Nielsenmas,  mL/hr at 10/11/22 0634 New Bag at 10/11/22 0634    sodium chloride flush 0.9 % injection 5-40 mL  5-40 mL IntraVENous 2 times per day Argie Lizzette, DO        sodium chloride flush 0.9 % injection 5-40 mL  5-40 mL IntraVENous PRN Argie Lizzette, DO        0.9 % sodium chloride infusion   IntraVENous PRN Argie Charleston, DO           Allergies: Allergies   Allergen Reactions    Tetracyclines & Related Itching    Penicillins      Other reaction(s): Facial Swelling       Problem List:    Patient Active Problem List   Diagnosis Code    Palpitations R00.2    History of echocardiogram Z92.89    Sleep apnea G47.30    Migraine G43.909    Gastroesophageal reflux disease K21.9       Past Medical History:        Diagnosis Date    Depression     Diabetes mellitus ([facility] [address]     Erythema     History of seasonal allergies     Kidney stones     PONV (postoperative nausea and vomiting)     Sleep apnea        Past Surgical History:        Procedure Laterality Date    COLONOSCOPY      EXTRACORPOREAL SHOCK WAVE LITHOTRIPSY      GALLBLADDER SURGERY      TONSILLECTOMY         Social History:    Social History     Tobacco Use    Smoking status: Never    Smokeless tobacco: Never   Substance Use Topics    Alcohol use:  No                                Counseling given: Not Answered      Vital Signs (Current):   Vitals:    10/05/22 0859 10/11/22 0618   BP:  (!) 152/94   Pulse:  76   Resp:  18   Temp:  36.1 °C (96.9 °F)   TempSrc:  Temporal   SpO2:  99%   Weight: 211 lb (95.7 kg) 211 lb (95.7 kg)   Height: 5' (1.524 m) 5' (1.524 m)                                              BP Readings from Last 3 Encounters:   10/11/22 (!) 152/94 10/01/22 122/61   09/21/21 128/61       NPO Status: Time of last liquid consumption: 2300                        Time of last solid consumption: 2000                        Date of last liquid consumption: 10/10/22                        Date of last solid food consumption: 10/10/22    BMI:   Wt Readings from Last 3 Encounters:   10/11/22 211 lb (95.7 kg)   10/05/22 210 lb (95.3 kg)   10/01/22 211 lb (95.7 kg)     Body mass index is 41.21 kg/m². CBC:   Lab Results   Component Value Date/Time    WBC 8.0 10/11/2022 06:32 AM    RBC 4.66 10/11/2022 06:32 AM    HGB 12.2 10/11/2022 06:32 AM    HCT 38.5 10/11/2022 06:32 AM    MCV 82.6 10/11/2022 06:32 AM    RDW 15.9 10/11/2022 06:32 AM     10/11/2022 06:32 AM       CMP:   Lab Results   Component Value Date/Time     12/28/2010 12:20 PM    K 3.6 12/28/2010 12:20 PM     12/28/2010 12:20 PM    CO2 27 12/28/2010 12:20 PM    BUN 16 12/28/2010 12:20 PM    CREATININE 1.0 12/28/2010 12:20 PM    LABGLOM 58 12/28/2010 02:05 PM    GLUCOSE 86 12/28/2010 12:20 PM    CALCIUM 9.3 12/28/2010 12:20 PM       POC Tests: No results for input(s): POCGLU, POCNA, POCK, POCCL, POCBUN, POCHEMO, POCHCT in the last 72 hours. Coags: No results found for: PROTIME, INR, APTT    HCG (If Applicable): No results found for: PREGTESTUR, PREGSERUM, HCG, HCGQUANT     ABGs: No results found for: PHART, PO2ART, BAB2KIU, EAS6KMZ, BEART, M9STFBOO     Type & Screen (If Applicable):  No results found for: LABABO, LABRH    Drug/Infectious Status (If Applicable):  No results found for: HIV, HEPCAB    COVID-19 Screening (If Applicable): No results found for: COVID19        Anesthesia Evaluation  Patient summary reviewed and Nursing notes reviewed   history of anesthetic complications: PONV.   Airway: Mallampati: III  TM distance: <3 FB   Neck ROM: full  Mouth opening: < 3 FB   Dental: normal exam     Comment: Pt denies any dental abnormalities; dentition appears intact    Pulmonary:normal exam (+) sleep apnea:      (-) not a current smoker                           Cardiovascular:  Exercise tolerance: good (>4 METS),   (+) hypertension:, hyperlipidemia      ECG reviewed  Rhythm: regular  Rate: normal  Echocardiogram reviewed         Beta Blocker:  Dose within 24 Hrs         Neuro/Psych:   (+) psychiatric history:depression/anxiety             GI/Hepatic/Renal:   (+) GERD:, renal disease: kidney stones,           Endo/Other:    (+) DiabetesType II DM, well controlled, , .                 Abdominal:   (+) obese,     Abdomen: soft. Vascular: negative vascular ROS. Other Findings:           Anesthesia Plan      general and regional     ASA 3     (PIV)  Induction: intravenous. MIPS: Postoperative opioids intended, Prophylactic antiemetics administered and Postoperative trial extubation. Anesthetic plan and risks discussed with patient and spouse. Use of blood products discussed with patient and spouse whom consented to blood products. Plan discussed with CRNA and attending.                     Zach Mobley RN, [facility]  10/11/2022

## 2022-10-11 NOTE — BRIEF OP NOTE
Brief Postoperative Note      Patient: Laureano Bowie  YOB: 1955  MRN: [id number]    Date of Procedure: 10/11/2022    Pre-Op Diagnosis: Intra-articular 2 part distal radius fracture on the right    Post-Op Diagnosis: Same       Procedure(s):  RIGHT DISTAL RADIUS OPEN REDUCTION INTERNAL FIXATION    Surgeon(s):  Delmer Iverson DO    Assistant:  Resident: Serafina Koyanagi, DO; Maxine Christy DO    Anesthesia: General    Estimated Blood Loss (mL): Minimal    Complications: None    Specimens:   * No specimens in log *    Implants:  Implant Name Type Inv.  Item Serial No.  Lot No. LRB No. Used Action   PLATE BNE [id number] STD 6X3 H ST R DST RAD VOLAR S STL ESTHER - [id number]  PLATE BNE [id number] STD 6X3 H ST R DST RAD VOLAR S STL ESTHER  DEPUY SY[employer]  Right 1 Implanted   SCREW BNE L16MM DIA2.4MM DST RAD VOLAR S STL ST ESTHER ANG NATHAN - [id number]  SCREW BNE L16MM DIA2.4MM DST RAD VOLAR S STL ST ESTHER ANG NATHAN  DEPUY SYNTHES [employer]-WD  Right 1 Implanted   SCREW BNE L20MM DIA2.4MM DST RAD VOLAR S STL ST ESTHER ANG NATHAN - [id number]  SCREW BNE L20MM DIA2.4MM DST RAD VOLAR S STL ST ESTHER ANG NATHAN  DEPUY SYNTHES [employer]-WD  Right 3 Implanted   SCREW BNE L12MM DIA2.7MM MILLA S STL ST T8 STARDRV RECESS - [id number]  SCREW BNE L12MM DIA2.7MM MILLA S STL ST T8 STARDRV RECESS  DEPUY SYNTHES USA-WD  Right 3 Implanted   SCREW BNE L14MM DIA2.7MM MILLA S STL ST T8 STARDRV RECESS - [id number]  SCREW BNE L14MM DIA2.7MM MILLA S STL ST T8 STARDRV RECESS  DEPUY SYNTHES [employer]-WD  Right 1 Implanted         Drains: * No LDAs found *    Findings: Intra-articular 2 part distal radius fracture on the right fixed in standard fashion    Electronically signed by Yasmine Haq DO on 10/11/2022 at 10:20 AM

## 2022-10-11 NOTE — PROCEDURES
Date: 10/11/2022    Time: 1002 AM  Patient Placed On BIPAP/CPAP/ Non-Invasive Ventilation? Yes    If no must comment. Facial area red/color change? No           If YES are Blister/Lesion present? No   If yes must notify nursing staff  BIPAP/CPAP skin barrier?   Yes    Skin barrier type:mepilexlite       Comments: Patient placed on BIPAP in [address] [place] [address]

## 2022-10-11 NOTE — OP NOTE
Operative Note      Patient: Willian Molina  YOB: 1955  MRN: [id number]    Date of Procedure: 10/11/2022    Pre-Op Diagnosis: Closed right intra-articular 2 part distal radius fracture    Post-Op Diagnosis: Same       Procedure(s):  RIGHT DISTAL RADIUS OPEN REDUCTION INTERNAL FIXATION    Surgeon(s):  Evette Grimes DO    Assistant:   Resident: Ryan Garces DO; Senait Rice DO    Anesthesia: General    Estimated Blood Loss (mL): Minimal    Complications: None    Specimens:   * No specimens in log *    Implants:  Implant Name Type Inv. Item Serial No.  Lot No. LRB No. Used Action   PLATE BNE [id number] STD 6X3 H ST R DST RAD VOLAR S STL ESTHER - [id number]  PLATE BNE [id number] STD 6X3 H ST R DST RAD VOLAR S STL ESTHER  DEPUY SY[employer]-WD  Right 1 Implanted   SCREW BNE L16MM DIA2.4MM DST RAD VOLAR S STL ST ESTHER ANG NATHAN - [id number]  SCREW BNE L16MM DIA2.4MM DST RAD VOLAR S STL ST ESTHER ANG NATHAN  DEPUY SYNTHES USA-WD  Right 1 Implanted   SCREW BNE L20MM DIA2.4MM DST RAD VOLAR S STL ST ESTHER ANG NATHAN - [id number]  SCREW BNE L20MM DIA2.4MM DST RAD VOLAR S STL ST ESTHER ANG NATHAN  DEPUY SYNTHES [employer]-WD  Right 3 Implanted   SCREW BNE L12MM DIA2.7MM MILLA S STL ST T8 STARDRV RECESS - [id number]  SCREW BNE L12MM DIA2.7MM MILLA S STL ST T8 STARDRV RECESS  DEPUY SYNTHES USA-WD  Right 3 Implanted   SCREW BNE L14MM DIA2.7MM MILLA S STL ST T8 STARDRV RECESS - [id number]  SCREW BNE L14MM DIA2.7MM MILLA S STL ST T8 STARDRV RECESS  DEPUY SYNTHES [employer]-WD  Right 1 Implanted         Drains: * No LDAs found *    Findings: See op note below    Detailed Description of Procedure:   Marly Lema is a 82-year-old female who presented my office with a right displaced distal radius fracture that underwent closed reduction in the emergency department. Due to the degree of displacement and comminution along the intra-articular nature of the injury we have elected to proceed with open reduction internal fixation.   Risk benefits and alternatives of surgery discussed in detail and she wished to proceed. She was seen in preoperative holding on the day of surgery and her right hand was marked my initials informed sent was signed and discussed again in detail with the patient and her family. She was rolled to the OR after undergoing a regional block. Nonsterile tourniquet was placed on her right upper arm and she was placed supine on the OR table with the right arm on a hand table. All bony prominences well-padded. Preop antibiotics were infused. Right arm was then prepped and draped in standard sterile orthopedic fashion and appropriate timeout was performed confirming side and site of surgery to be the right distal radius. FCR approach was started with the skin incision after inflation of the tourniquet to 250 mmHg. Dissection care was carried down to the FCR and FCR subbed sheath was incised. The second was carried down the pronator quadratus and the pronator quadratus was elevated off the distal radius. Brachioradialis tendon was released sharply we could see the first dorsal compartment tendons. There is intra-articular split extending of the radial styloid. Reduction maneuver was performed using a freer manipulation and the reduction was pinned into place. Reduction was confirmed with AP and lateral images. We then selected a 3 hole [employer] standard distal radius locking plate and pinned this into place. Position of the plate was confirmed with x-ray. An 8 mm locking screw was placed into the plate outside of the bone as a kickstand. All the distal locking screw was then placed under fluoroscopic guidance. Kickstand screw was removed and cortical screws were placed in the shaft reducing our volar tilt of her distal radius. 2 more proximal cortical screws were placed in the plate completing our construct.   Final images with AP lateral and oblique along with tangential skyline view were taken confirming anatomic reduction of her 2 part intra-articular distal radius fracture with appropriate hardware placement. The wrist was taken through range of motion and the fracture was felt to be stable. DRUJ was stressed and felt to be stable as well. Tourniquet was released hemostasis was noted to be adequate. The wound was copiously irrigated with sterile saline. The wound was then closed in a layered fashion using 2-0 Monocryl and 3-0 nylon. A soft bulky dressing was applied and a resting volar splint was placed. Patient was awakened anesthesia transferred PACU stable condition. She will discharge home. Splint will remain in place in the fall. Pain medicines were sent to the pharmacy. She will follow-up in the office in 10 to 14 days.     Electronically signed by Isa Rojas DO on 10/11/2022 at 10:21 AM

## 2022-10-11 NOTE — ANESTHESIA PROCEDURE NOTES
Peripheral Block    Patient location during procedure: pre-op  Reason for block: post-op pain management and at surgeon's request  Start time: 10/11/2022 7:34 AM  End time: 10/11/2022 7:36 AM  Staffing  Performed: anesthesiologist   Anesthesiologist: Weston Ferris DO  Preanesthetic Checklist  Completed: patient identified, IV checked, site marked, risks and benefits discussed, surgical/procedural consents, equipment checked, pre-op evaluation, timeout performed, anesthesia consent given, oxygen available and monitors applied/VS acknowledged  Peripheral Block   Patient position: sitting  Prep: ChloraPrep  Provider prep: mask and sterile gloves  Patient monitoring: cardiac monitor, continuous pulse ox, frequent blood pressure checks and IV access  Block type: Axillary  Laterality: right  Injection technique: single-shot  Guidance: ultrasound guided  Local infiltration: lidocaine and decadron  Infiltration strength: 1 %  Local infiltration: lidocaine and decadron  Dose: 3 mL    Needle   Needle gauge: 21 G  Needle localization: ultrasound guidance  Needle length: 10 cm  Assessment   Injection assessment: negative aspiration for heme, no paresthesia on injection and local visualized surrounding nerve on ultrasound  Paresthesia pain: none  Slow fractionated injection: yes  Hemodynamics: stable  Real-time US image taken/store: yes  Outcomes: uncomplicated and patient tolerated procedure well    Additional Notes  U/S [id number].   Timeout performed          Medications Administered  ropivacaine (NAROPIN) injection 0.5% - Perineural   40 mL - 10/11/2022 7:36:00 AM

## 2022-10-11 NOTE — H&P
Patient seen and examined in preop holding. Right wrist was marked with my initials. Risk benefits alternatives surgery discussed in detail. She wished to proceed with open reduction internal fixation right distal radius today in the OR. No changes since office visit    CHIEF COMPLAINT:        Chief Complaint   Patient presents with    Wrist Injury       Fracture right distal radius. DOI 10/3/2022. Right knee gave out as she was going up steps, she fell down 4 steps, landing on right wrist.  X-rays in epic, splinted. Right hand dominant. HPI:       Polly Mccoy is a 74y.o. year old female who is right-hand dominant. She sustained injury to her right wrist falling on the stairs over the weekend. Her knee gave out when she was going up and on her wrist.  She had a closed reduction of her right displaced distal radius fracture in the emergency department. She has no other injuries. Denies numbness tingling. Denies fevers or chills. PAST MEDICAL HISTORY  Past Medical History        Past Medical History:   Diagnosis Date    Depression      Diabetes mellitus ([address]      Erythema      History of seasonal allergies      Kidney stones      PONV (postoperative nausea and vomiting)      Sleep apnea              PAST SURGICAL HISTORY  Past Surgical History         Past Surgical History:   Procedure Laterality Date    COLONOSCOPY        EXTRACORPOREAL SHOCK WAVE LITHOTRIPSY        GALLBLADDER SURGERY        TONSILLECTOMY                FAMILY HISTORY   Family History   History reviewed. No pertinent family history.         SOCIAL HISTORY  Social History            Occupational History    Not on file   Tobacco Use    Smoking status: Never    Smokeless tobacco: Never   Vaping Use    Vaping Use: Never used   Substance and Sexual Activity    Alcohol use: No    Drug use: No    Sexual activity: Not Currently       Partners: Male         CURRENT MEDICATIONS     Current Medication      Current Outpatient Medications:     HYDROcodone-acetaminophen (NORCO) 5-325 MG per tablet, Take 1 tablet by mouth every 6 hours as needed for Pain., Disp: , Rfl:     ibuprofen (ADVIL;MOTRIN) 200 MG tablet, Take 400 mg by mouth every 6 hours as needed for Pain (in between norco), Disp: , Rfl:     amphetamine-dextroamphetamine (ADDERALL XR) 20 MG extended release capsule, Take 1 capsule by mouth daily. , Disp: , Rfl:     loratadine (CLARITIN) 10 MG tablet, Take 1 tablet by mouth daily as needed, Disp: , Rfl:     metFORMIN (GLUCOPHAGE) 500 MG tablet, Take 1 tablet by mouth in the morning and at bedtime, Disp: , Rfl:     Cholecalciferol (VITAMIN D3) 25 MCG (1000 UT) TABS, Take 1,000 Units by mouth daily, Disp: , Rfl:     NONFORMULARY, 1,000 mcg daily, Disp: , Rfl:     buPROPion (WELLBUTRIN XL) 300 MG extended release tablet, TAKE 1 TABLET DAILY AS DIRECTED., Disp: , Rfl: 1    clonazePAM (KLONOPIN) 1 MG tablet, Takes 1 tablet at HS, Disp: , Rfl: 3    escitalopram (LEXAPRO) 20 MG tablet, TAKE 1 TABLET BY MOUTH EVERY DAY, Disp: , Rfl: 1    metoprolol succinate (TOPROL XL) 25 MG extended release tablet, Take 12.5 mg by mouth 2 times daily, Disp: , Rfl: 3    omeprazole (PRILOSEC) 20 MG delayed release capsule, TAKE ONE CAPSULE BY MOUTH EVERY MORNING, Disp: , Rfl: 1    rosuvastatin (CRESTOR) 5 MG tablet, Take 5 mg by mouth daily (Patient not taking: Reported on 10/5/2022), Disp: , Rfl:         ALLERGIES        Allergies   Allergen Reactions    Tetracyclines & Related Itching    Penicillins         Other reaction(s): Facial Swelling         Controlled Substances Monitoring:          REVIEW OF SYSTEMS:      Constitutional:  Negative for weight loss, fevers, chills, fatigue  Cardiovascular: Negative for chest pain, palpitations  Pulmonary: Negative for shortness of breath, labored breathing, cough  GI: negative for abdominal pain, nausea, vomitting   MSK: per HPI  Skin: negative for rash, open wounds     All other systems reviewed and are negative PHYSICAL EXAM      Vitals       Vitals:     10/05/22 1515   Weight: 210 lb (95.3 kg)   Height: 5' (1.524 m)            Height: 5' (1.524 m)  Weight: [unfilled]  BMI:  Body mass index is 41.01 kg/m². General: The patient is alert and oriented x 3, appears to be stated age and in no distress. HEENT: head is normocephalic, atraumatic. EOMI. Neck: supple, trachea midline, no thyromegaly   Cardiovascular: peripheral pulses palpable. Normal Capillary refill   Respiratory: breathing unlabored, chest expansion symmetric   Skin: no rash, no open wounds, no erythema  Psych: normal affect; mood stable  Neurologic: gait normal, sensation grossly intact in extremities  MSK:        Hand/Wrist:  Well-padded sugar-tong splint in place. Fingers are warm and well-perfused. Can wiggle wiggle fingers. Brisk capillary refill. Nontender along her shoulder. IMAGING:      XRAY: Of the wrist reviewed from the hospital revealed intra-articular displaced dorsally distal radius fracture on the right     Radiographic findings reviewed with patient     ASSESSMENT  Right displaced intra-articular distal radius fracture        PLAN  -Treatment plan discussed in detail with the patient. Due to the nature of this injury she has multiple risk factors for failed closed treatment. For this reason we recommend open reduction internal fixation with plate and screws. Risk benefits alternatives of surgery discussed detail she wished to proceed. We discussed the risk of tendon rupture with surgery along with damage to surrounding structures then chance of nonunion. She is also going to need osteoporosis work-up by her family physician moving forward. I recommend a DEXA scan once his surgery is over. She is at high risk for future fragility fractures. Surgery scheduled for next Tuesday.   This will be an outpatient surgery

## 2022-10-11 NOTE — DISCHARGE INSTRUCTIONS
Orthopedic surgery discharge instructions  Nonweightbearing right upper extremity. Please keep splint in place clean and dry until follow-up. Okay to use her fingers and encourage range of motion.   Take pain medications as prescribed  Call the office for your follow-up appointment in 10 to 14 days

## 2022-10-11 NOTE — PROGRESS NOTES
Patient admitted to [facility] & placed on appropriate monitors. Cart low, locked with siderails up. Call light within reach. Patient sitting up in bed taking PO. Family called bedside. Patient given discharge instructions & verbalized understanding.

## 2022-10-11 NOTE — PROGRESS NOTES
Dr. Earl Figueredo called regarding need for bipap post-op. Patient wears CPAP at home. Order received. Respiratory notified.

## 2022-10-12 RX ORDER — MIDAZOLAM HYDROCHLORIDE 1 MG/ML
INJECTION INTRAMUSCULAR; INTRAVENOUS PRN
Status: DISCONTINUED | OUTPATIENT
Start: 2022-10-11 | End: 2022-10-12 | Stop reason: SDUPTHER

## 2022-10-13 NOTE — ANESTHESIA POSTPROCEDURE EVALUATION
Department of Anesthesiology  Postprocedure Note    Patient: Ridge Koenig  MRN: [id number]  YOB: 1955  Date of evaluation: 10/13/2022      Procedure Summary     Date: 10/11/22 Room / Location: [facility] OR [facility] / [facility]    Anesthesia Start: 1808 Anesthesia Stop: 1005    Procedure: RIGHT DISTAL RADIUS OPEN REDUCTION INTERNAL FIXATION (Right: Arm Lower) Diagnosis:       Closed fracture of distal end of right radius, unspecified fracture morphology, initial encounter      (Closed fracture of distal end of right radius, unspecified fracture morphology, initial encounter [S52.501A])    Surgeons: Keagan George DO Responsible Provider: Weston Ferris DO    Anesthesia Type: general, regional ASA Status: 3          Anesthesia Type: No value filed.     Cheryl Phase I: Cheryl Score: 10    Cheryl Phase II: Cheryl Score: 10      Anesthesia Post Evaluation    Patient location during evaluation: PACU  Patient participation: complete - patient participated  Level of consciousness: awake and alert  Airway patency: patent  Nausea & Vomiting: no nausea and no vomiting  Complications: no  Cardiovascular status: blood pressure returned to baseline  Respiratory status: acceptable  Hydration status: euvolemic  Comments: Late entry but done 1 hour after procedure completion  Multimodal analgesia pain management approach

## 2022-10-26 ENCOUNTER — OFFICE VISIT (OUTPATIENT)
Dept: ORTHOPEDIC SURGERY | Age: 67
End: 2022-10-26

## 2022-10-26 ENCOUNTER — TELEPHONE (OUTPATIENT)
Dept: ORTHOPEDIC SURGERY | Age: 67
End: 2022-10-26

## 2022-10-26 VITALS — BODY MASS INDEX: 41.23 KG/M2 | WEIGHT: 210 LBS | HEIGHT: 60 IN

## 2022-10-26 DIAGNOSIS — M25.561 ACUTE PAIN OF RIGHT KNEE: ICD-10-CM

## 2022-10-26 DIAGNOSIS — S52.531D CLOSED COLLES' FRACTURE OF RIGHT RADIUS WITH ROUTINE HEALING, SUBSEQUENT ENCOUNTER: Primary | ICD-10-CM

## 2022-10-26 PROCEDURE — 99024 POSTOP FOLLOW-UP VISIT: CPT | Performed by: STUDENT IN AN ORGANIZED HEALTH CARE EDUCATION/TRAINING PROGRAM

## 2022-10-26 RX ORDER — METHYLPREDNISOLONE 4 MG/1
TABLET ORAL
Qty: 1 KIT | Refills: 0 | Status: SHIPPED | OUTPATIENT
Start: 2022-10-26 | End: 2022-11-05

## 2022-10-26 NOTE — TELEPHONE ENCOUNTER
10/26/2022 Late Entry    10/25/2022 9:38 am Patient phoned the office / Message left on voice mail: I had surgery 10 days ago but now I am having another situation. [phone]    10/25/2022 11:07 am Follow up phone call to [phone]/ Message left on voice mail: I am returning your call / Please return my call.

## 2022-10-26 NOTE — PROGRESS NOTES
Follow Up Post Operative Visit     Surgery: Right distal radius open reduction internal fixation  Date: 10/11/2022    Subjective:    Lily Rivero is here for follow up visit s/p above procedure. She is doing well. She has been having knee and leg pain starting in her back and radiating down past her knee. She has difficulty ambulating. Also her  had a stroke on Sunday and is currently in the hospital.  Denies numbness tingling in her hand. Her pain is improving in her wrist.    Controlled Substances Monitoring:        Physical Exam:    Height: 5' (1.524 m), Weight: 210 lb (95.3 kg)    General: Alert and oriented x3, no acute distress  Cardiovascular/pulmonary: No labored breathing, peripheral perfusion intact  Musculoskeletal:    Right wrist: Sutures in place's incision well approximated. Sutures removed. Wrist range of motion is limited due to postoperative state however she can flex and extend her wrist appropriately. She has full pronation and supination intact radial and ulnar deviation. Sensation light touch is intact radial ulnar median nerve distribution. EPL is firing appropriately. She has full finger flexion and extension including her thumb. Imaging: 3 views of the right wrist reviewed today demonstrate anatomic reduction after open reduction internal fixation right distal radius fracture in good alignment without hardware failure  4 views of the right knee were reviewed demonstrate mild medial compartmental osteoarthritis with no fractures dislocations    Assessment and Plan: 2-week status post right distal radius fracture open reduction internal fixation  Sutures removed today. She is healing appropriately. We will place her in a removable wrist brace and discontinue the sling. She is counseled to come out of her wrist brace to work on hand and wrist range of motion. We will give her a Medrol Dosepak for her sciatica.   We will see her back in 4 weeks for repeat imaging and examination. All of her questions answered in detail.   She is happy with her results thus far    Tyron Onofre, DO   Orthopaedic Surgery   10/26/22   11:02 AM EDT

## 2022-10-26 NOTE — PROGRESS NOTES
Sutures removed from op site right arm. A wrist brace was applied to Her Right wrist(s). Use and care of the brace was reviewed with patient The patient denied any issues with fit or comfort of the braces  Patient instructed to call our office if there are any issues with the braces.     Brace supplied by and billed for by [facility]

## 2022-11-23 ENCOUNTER — OFFICE VISIT (OUTPATIENT)
Dept: ORTHOPEDIC SURGERY | Age: 67
End: 2022-11-23

## 2022-11-23 VITALS — WEIGHT: 210 LBS | HEIGHT: 60 IN | BODY MASS INDEX: 41.23 KG/M2

## 2022-11-23 DIAGNOSIS — S52.501D CLOSED FRACTURE OF DISTAL END OF RIGHT RADIUS WITH ROUTINE HEALING, UNSPECIFIED FRACTURE MORPHOLOGY, SUBSEQUENT ENCOUNTER: Primary | ICD-10-CM

## 2022-11-23 PROCEDURE — 99024 POSTOP FOLLOW-UP VISIT: CPT | Performed by: STUDENT IN AN ORGANIZED HEALTH CARE EDUCATION/TRAINING PROGRAM

## 2022-11-23 RX ORDER — HYDROCODONE BITARTRATE AND ACETAMINOPHEN 5; 325 MG/1; MG/1
TABLET ORAL
COMMUNITY

## 2022-11-23 RX ORDER — CYCLOBENZAPRINE HCL 5 MG
TABLET ORAL
COMMUNITY

## 2022-11-23 NOTE — PROGRESS NOTES
Follow Up Post Operative Visit     Surgery: Right distal radius open reduction internal fixation  Date: 10/11/2022    Subjective:    Dakota Castano is here for follow up visit s/p above procedure. She is doing well. She has full range of motion of her wrist which is pain-free. Her biggest complaint is low back pain with sciatica and she is going to see her back doctor today. She is very happy with her results. Controlled Substances Monitoring:        Physical Exam:    Height: 5' (1.524 m), Weight: 210 lb (95.3 kg)    General: Alert and oriented x3, no acute distress  Cardiovascular/pulmonary: No labored breathing, peripheral perfusion intact  Musculoskeletal:    Right wrist: Incision healed appropriately. Full wrist range of motion in flexion extension radial and ulnar deviation pronation and supination sensation light touch is intact radial ulnar median nerve distribution. EPL is firing appropriately. She has full finger flexion and extension including her thumb. Imaging: 3 views of the right wrist reviewed today demonstrate anatomic reduction after open reduction internal fixation right distal radius fracture in good alignment without hardware failure      Assessment and Plan:6-week status post right distal radius fracture open reduction internal fixation  -She is healed appropriately from this injury. Her x-rays look great. Clinically her results are very good. She has full range of motion without any pain. At this point I think she is fully healed. She can use her wrist without restrictions. We can schedule an appointment in 2 months for 1 final x-ray to see how she is doing. If she is doing okay and does not have any issues she can cancel the appointment. She is happy with her treatment results thus far. All of her questions were answered.     Estelle Pod DO   Orthopaedic Surgery   11/23/22  10:20 AM

## 2024-01-31 NOTE — PROGRESS NOTES
44.31 kg/m²     Physical Exam  Vitals reviewed.   Constitutional:       Appearance: She is obese.   HENT:      Head: Normocephalic and atraumatic.   Neck:      Vascular: No carotid bruit.   Cardiovascular:      Rate and Rhythm: Normal rate and regular rhythm.      Heart sounds: Normal heart sounds. No murmur heard.  Pulmonary:      Effort: Pulmonary effort is normal. No respiratory distress.      Breath sounds: Normal breath sounds.   Musculoskeletal:      Right lower leg: No edema.      Left lower leg: No edema.   Neurological:      Mental Status: She is alert and oriented to person, place, and time.      Motor: No weakness.      Coordination: Coordination normal.      Gait: Gait normal.   Psychiatric:         Mood and Affect: Mood normal.         Behavior: Behavior normal.     ______________________    HISTORY & ASSESSMENT/PLAN -     Problem 1  - NAGI   HPI   - On CPAP, getting good rest with CPAP, no acute issues 100 % compliance   Assessment  - Controlled.  Plan   - Continue CPAP, weight reduction can help, planned as noted below      Problem 2 -  Obesity  HPI - See above Background for description  Weight  Date   212 lbs  02/01/2024    Patient's estimated daily energy need (DEN) = 1882 Derek/d     Weight effect of high risk foods =  Fast food 525 Derek/week Sweets 2520 Derek + salty 885 Derek + SSB  1133 Derek= 5063 Derek/week=  723 Derek/day 38% DEN,= 72 lbs/year . .     She is not interested in surgery. Nor is she interested in the VLCD. She has opted for calorie counting with an milton she and her  used in the past.     She feels she needs an appetite suppressant, and after going over options and adverse effects, we planned to start Phentermine.  She is no longer on the adderall and her blood pressure in under control    Assessment - Uncontrolled      Plan -     Patient Instructions:     Phentermine:    Take phentermine 37.5 mg, one-half to one tablet daily as needed for appetite suppression. Take each dose 30-90 min

## 2024-02-01 ENCOUNTER — OFFICE VISIT (OUTPATIENT)
Dept: BARIATRICS/WEIGHT MGMT | Age: 69
End: 2024-02-01
Payer: MEDICARE

## 2024-02-01 VITALS
RESPIRATION RATE: 20 BRPM | HEART RATE: 77 BPM | BODY MASS INDEX: 44.5 KG/M2 | SYSTOLIC BLOOD PRESSURE: 136 MMHG | DIASTOLIC BLOOD PRESSURE: 76 MMHG | WEIGHT: 212 LBS | TEMPERATURE: 96.8 F | HEIGHT: 58 IN

## 2024-02-01 DIAGNOSIS — E66.01 MORBID OBESITY WITH BMI OF 40.0-44.9, ADULT (HCC): ICD-10-CM

## 2024-02-01 DIAGNOSIS — M53.86 SCIATICA ASSOCIATED WITH DISORDER OF LUMBAR SPINE: Primary | ICD-10-CM

## 2024-02-01 PROCEDURE — 1036F TOBACCO NON-USER: CPT | Performed by: CLINICAL NURSE SPECIALIST

## 2024-02-01 PROCEDURE — 99202 OFFICE O/P NEW SF 15 MIN: CPT

## 2024-02-01 PROCEDURE — G8400 PT W/DXA NO RESULTS DOC: HCPCS | Performed by: CLINICAL NURSE SPECIALIST

## 2024-02-01 PROCEDURE — G8417 CALC BMI ABV UP PARAM F/U: HCPCS | Performed by: CLINICAL NURSE SPECIALIST

## 2024-02-01 PROCEDURE — G8484 FLU IMMUNIZE NO ADMIN: HCPCS | Performed by: CLINICAL NURSE SPECIALIST

## 2024-02-01 PROCEDURE — 3017F COLORECTAL CA SCREEN DOC REV: CPT | Performed by: CLINICAL NURSE SPECIALIST

## 2024-02-01 PROCEDURE — 1090F PRES/ABSN URINE INCON ASSESS: CPT | Performed by: CLINICAL NURSE SPECIALIST

## 2024-02-01 PROCEDURE — G8428 CUR MEDS NOT DOCUMENT: HCPCS | Performed by: CLINICAL NURSE SPECIALIST

## 2024-02-01 PROCEDURE — 1123F ACP DISCUSS/DSCN MKR DOCD: CPT | Performed by: CLINICAL NURSE SPECIALIST

## 2024-02-01 PROCEDURE — 99205 OFFICE O/P NEW HI 60 MIN: CPT | Performed by: CLINICAL NURSE SPECIALIST

## 2024-02-01 RX ORDER — PHENTERMINE HYDROCHLORIDE 37.5 MG/1
37.5 TABLET ORAL DAILY
Qty: 30 TABLET | Refills: 0 | Status: SHIPPED | OUTPATIENT
Start: 2024-02-01 | End: 2024-03-02

## 2024-02-01 RX ORDER — OMEPRAZOLE 40 MG/1
40 CAPSULE, DELAYED RELEASE ORAL EVERY MORNING
COMMUNITY
Start: 2024-01-04

## 2024-02-01 ASSESSMENT — ENCOUNTER SYMPTOMS
SHORTNESS OF BREATH: 1
VOMITING: 0
BACK PAIN: 1
COUGH: 0
NAUSEA: 0
DIARRHEA: 0
CONSTIPATION: 1

## 2024-02-01 NOTE — PATIENT INSTRUCTIONS
Phentermine:  Take phentermine 37.5 mg, one-half to one tablet daily as needed for appetite suppression. Take each dose 30-90 min before effect will be needed.     While taking phentermine, check the Blood Pressure every morning and every evening.      If the systolic BP is >155 mmHg, the diastolic BP is >90 mm/Hg or the heart rate is > 100 beats per minute, do not take phentermine that day.     If the systolic BP is consistently >155 mmHg, the diastolic BP is consistently above 90 mm/Hg or the heart rate is consistently > 100 beats per minute, then stop taking phentermine altogether.     If the systolic BP >170 mmHg or the diastolic BP is >100 mmHg (even if it is only once), then phentermine should be stopped altogether without proving that any of these are consistently elevated.     Side effects include but are not limited to an increased heart rate, elevated blood pressure, dry mouth, insomnia, constipation and nervousness. Patient verbalized understanding and will stop this medication right away if they experience any of these symptoms.     Rules:  Count every calorie every day use Kuliza milton to track   Limit sweets to one day per month  Limit chips/crackers/pretzels/nuts/popcorn to 100-150  sendy/day  Eliminate all sugar sweetened beverages (including fruit juice)  Limit restaurants (including fast food and food from a convenience store) to one time every two weeks while in town     Requirements:  Make sure protein intake is at least 75 grams per day (do not count protein every day; instead spot check your intake every 2-3 weeks and make sure what you think you are getting is close to accurate; consider using a protein shake if needed; these are in the pharmacy section of the stores, not the grocery section; Premier, Pure Protein and Fairlife are relatively inexpensive and taste good to most patients; other options are Nectar, Boost Max, Ensure Max, BeneProtein and GNC lean (which is lactose-free);   Nectar

## 2024-03-04 ENCOUNTER — OFFICE VISIT (OUTPATIENT)
Dept: BARIATRICS/WEIGHT MGMT | Age: 69
End: 2024-03-04
Payer: MEDICARE

## 2024-03-04 VITALS
TEMPERATURE: 97 F | HEART RATE: 63 BPM | BODY MASS INDEX: 43.37 KG/M2 | WEIGHT: 206.6 LBS | SYSTOLIC BLOOD PRESSURE: 123 MMHG | HEIGHT: 58 IN | DIASTOLIC BLOOD PRESSURE: 50 MMHG

## 2024-03-04 DIAGNOSIS — G47.33 OSA (OBSTRUCTIVE SLEEP APNEA): ICD-10-CM

## 2024-03-04 DIAGNOSIS — E66.01 MORBID OBESITY WITH BMI OF 40.0-44.9, ADULT (HCC): Primary | ICD-10-CM

## 2024-03-04 PROCEDURE — G8417 CALC BMI ABV UP PARAM F/U: HCPCS | Performed by: CLINICAL NURSE SPECIALIST

## 2024-03-04 PROCEDURE — 1036F TOBACCO NON-USER: CPT | Performed by: CLINICAL NURSE SPECIALIST

## 2024-03-04 PROCEDURE — G8484 FLU IMMUNIZE NO ADMIN: HCPCS | Performed by: CLINICAL NURSE SPECIALIST

## 2024-03-04 PROCEDURE — G8428 CUR MEDS NOT DOCUMENT: HCPCS | Performed by: CLINICAL NURSE SPECIALIST

## 2024-03-04 PROCEDURE — G8400 PT W/DXA NO RESULTS DOC: HCPCS | Performed by: CLINICAL NURSE SPECIALIST

## 2024-03-04 PROCEDURE — 1123F ACP DISCUSS/DSCN MKR DOCD: CPT | Performed by: CLINICAL NURSE SPECIALIST

## 2024-03-04 PROCEDURE — 3017F COLORECTAL CA SCREEN DOC REV: CPT | Performed by: CLINICAL NURSE SPECIALIST

## 2024-03-04 PROCEDURE — 1090F PRES/ABSN URINE INCON ASSESS: CPT | Performed by: CLINICAL NURSE SPECIALIST

## 2024-03-04 PROCEDURE — 99211 OFF/OP EST MAY X REQ PHY/QHP: CPT

## 2024-03-04 RX ORDER — PHENTERMINE HYDROCHLORIDE 37.5 MG/1
37.5 TABLET ORAL DAILY
Qty: 30 TABLET | Refills: 0 | Status: SHIPPED | OUTPATIENT
Start: 2024-03-04 | End: 2024-04-03

## 2024-03-04 NOTE — PATIENT INSTRUCTIONS
Weight  Date   212 lbs  02/01/2024 Start Phentermine   206.6 lbs 03/04/2024 Phentermine #1    Patient's estimated daily energy need (DEN) = 1882 Derek/d     Total weight change to date: - 5.4 lbs lbs      Weight effect of high risk foods =  Fast food 525 Derek/week Sweets 2520 Derek + salty 885 Derek + SSB  1133 Derek= 5063 Derek/week=  723 Derek/day 38% DEN,= 72 lbs/year     Average daily energy variance:          2/1/2024 - 3/4/2024: -5.4 lbs /32 d = -591 Derek/d deficit.  (2.5% change in total body weight)    Notes from Previous visit:    She is not interested in surgery. Nor is she interested in the VLCD. She has opted for calorie counting with an milton she and her  used in the past.     She feels she needs an appetite suppressant, and after going over options and adverse effects, we planned to start Phentermine.  She is no longer on the adderall and her blood pressure in under control        Update:    Calorie Using lose it milton   Protein: Not consistent GOAL: Increase protein 75 gm/day  Fiber: cereal , increased vegetables    Water: 48 oz GOAL: 64 oz /dy  Micronutrients: GOAL: start multivitamin daily  Sweets: 7 d/week  popsicle at night  Non-sweet snacks: 0 d/week  SSB: Decreased sugar in ice tea to 1/4 cup    Restaurant food: 3 x in the month  Appetite suppressant: Phentermine  Blood pressure has been variable Constipation 6-7/10 appetite suppression  Exercise: Walk the dog a few times a week    Medication management:    She has been diligent when her pressure is elevated she does not take the medication. She would lke to try 1/2 tab again and will closely monitor her blood pressure. She has noticed a difference in her blood pressure readings in both arms.     Assessment - Uncontrolled      Plan -     Patient Instructions:     Phentermine:    Take phentermine 37.5 mg, one-half to one tablet daily as needed for appetite suppression. Take each dose 30-90 min before effect will be needed.    While taking phentermine, check
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
## 2024-04-02 ENCOUNTER — OFFICE VISIT (OUTPATIENT)
Dept: BARIATRICS/WEIGHT MGMT | Age: 69
End: 2024-04-02
Payer: MEDICARE

## 2024-04-02 VITALS
HEIGHT: 58 IN | WEIGHT: 203.6 LBS | SYSTOLIC BLOOD PRESSURE: 125 MMHG | TEMPERATURE: 97.3 F | BODY MASS INDEX: 42.74 KG/M2 | HEART RATE: 71 BPM | DIASTOLIC BLOOD PRESSURE: 51 MMHG

## 2024-04-02 DIAGNOSIS — E66.01 MORBID OBESITY WITH BMI OF 40.0-44.9, ADULT (HCC): ICD-10-CM

## 2024-04-02 DIAGNOSIS — G47.33 OSA (OBSTRUCTIVE SLEEP APNEA): Primary | ICD-10-CM

## 2024-04-02 PROCEDURE — G8428 CUR MEDS NOT DOCUMENT: HCPCS | Performed by: CLINICAL NURSE SPECIALIST

## 2024-04-02 PROCEDURE — 1090F PRES/ABSN URINE INCON ASSESS: CPT | Performed by: CLINICAL NURSE SPECIALIST

## 2024-04-02 PROCEDURE — 1123F ACP DISCUSS/DSCN MKR DOCD: CPT | Performed by: CLINICAL NURSE SPECIALIST

## 2024-04-02 PROCEDURE — 3017F COLORECTAL CA SCREEN DOC REV: CPT | Performed by: CLINICAL NURSE SPECIALIST

## 2024-04-02 PROCEDURE — G8400 PT W/DXA NO RESULTS DOC: HCPCS | Performed by: CLINICAL NURSE SPECIALIST

## 2024-04-02 PROCEDURE — 99211 OFF/OP EST MAY X REQ PHY/QHP: CPT

## 2024-04-02 PROCEDURE — 99213 OFFICE O/P EST LOW 20 MIN: CPT | Performed by: CLINICAL NURSE SPECIALIST

## 2024-04-02 PROCEDURE — 1036F TOBACCO NON-USER: CPT | Performed by: CLINICAL NURSE SPECIALIST

## 2024-04-02 PROCEDURE — G8417 CALC BMI ABV UP PARAM F/U: HCPCS | Performed by: CLINICAL NURSE SPECIALIST

## 2024-04-02 RX ORDER — PHENTERMINE HYDROCHLORIDE 37.5 MG/1
37.5 TABLET ORAL DAILY
Qty: 30 TABLET | Refills: 0 | Status: SHIPPED | OUTPATIENT
Start: 2024-04-02 | End: 2024-05-02

## 2024-04-02 ASSESSMENT — ENCOUNTER SYMPTOMS
NAUSEA: 0
SHORTNESS OF BREATH: 0
VOMITING: 0
CONSTIPATION: 1

## 2024-04-02 NOTE — PROGRESS NOTES
eat outside of the dining room or the kitchen  Do not eat while watching TV, videos, working on the computer or using a smart phone  Do not eat food out of a multi-serving bag or container.    Food Resources :     Low calorie non-starchy vegetables:  Food (per 100 g) Calories Fibers T. Carbohydrates Protein Fat Sodium (mg) Potassium (mg)   Green Bell Peppers 10 1.7 4.6 0.9 0.2 3 175   Cucumbers 15 0.5 3.6 0.7 0.1 2 147   Celery 16 1.6 3 0.7 0.2 80 260   Tomatoes 18 1.2 3.9 0.9 0.2 5 237   Carrots 41 2.8 10 0.9 0.2 69 320   Cabbage 25 2.5 6 1.3 0.1 18 170   Broccoli 35 3.3 7.2 2.4 0.4 41 293   Cauliflower 23 2.3 4.1 1.8 0.5 15 142   Iceberg Lettuce 14 1.2 3 0.9 0.1 10 141   Kale 28 2 5.6 1.9 0.4 23 228   Brussel Sprouts 43 3.8 9 3.4 0.3 25 389   Spinach 23 2.4 3.8 3 0.3 70 466   Zucchini 15 1 2.7 1.1 0.4 3 264   Mushroom 28 2.2 5.3 2.2 0.5 2 356   Asparagus 22 2 4.1 2.4 0.2 14 224   Green Beans 35 3.2 7.9 1.9 0.3 1 146   Eggplant 35 2.5 8.7 0.8 0.2 1 123     Protein options (20-30 grams protein): 1 cup of low fat cottage cheese (180 derek/20 grams protein), 6 egg whites + 1 whole egg (170 derek, 24 grams of protein), 3 oz of chicken (130 derek, 25 grams protein), low fat, high protein Greek yogurt (240 derek, 30 grams protein),  4 oz.Tempeh (193 Derek/ 18 grams protein). 1 cup tofu ( 182 Derek/ 20 grams protein). 1/2 cup seitan ( 169 Ca/ 30 grams protein)    Return to see me in 1 month    Total time spent on encounter 25 minutes     KEREN Lopez - CNS  Obesity

## 2024-04-02 NOTE — PATIENT INSTRUCTIONS
options; Quest (or Cosco, which is cheaper and is ordered on Amazon) and the Oh Yeah 1 protein bars can also be used, but have less protein in them )    (Disclaimer: Dietary supplements rarely have their listed ingredients and the amount of each verified by a third party other. Sometimes they give verification for their claims to be GMO and gluten free and to be organic. However, even such verifications as these may still be untrustworthy.)    Make sure that fiber intake is at least 22 grams per day. Do this by either eating 12 tablespoons of the original, plain Fiber One cereal every day or 4 tablespoons of wheat dextrin powder (Benefiber or a generic brand) every day. Work up to this amount slowly by starting with only one-eighth to one-fourth of the target amount and then adding another one-eighth to one-fourth every one or two weeks until reaching the target.      Take one multivitamin every day    Targets:  Limit calorie intake to 1400 calories/day  Start swimming at the gym 2-3 times a week  Avoid eating 2 hours within bedtime.   64 oz of water     Tips:  Do not eat outside of the dining room or the kitchen  Do not eat while watching TV, videos, working on the computer or using a smart phone  Do not eat food out of a multi-serving bag or container.    Food Resources :     Low calorie non-starchy vegetables:  Food (per 100 g) Calories Fibers T. Carbohydrates Protein Fat Sodium (mg) Potassium (mg)   Green Bell Peppers 10 1.7 4.6 0.9 0.2 3 175   Cucumbers 15 0.5 3.6 0.7 0.1 2 147   Celery 16 1.6 3 0.7 0.2 80 260   Tomatoes 18 1.2 3.9 0.9 0.2 5 237   Carrots 41 2.8 10 0.9 0.2 69 320   Cabbage 25 2.5 6 1.3 0.1 18 170   Broccoli 35 3.3 7.2 2.4 0.4 41 293   Cauliflower 23 2.3 4.1 1.8 0.5 15 142   Iceberg Lettuce 14 1.2 3 0.9 0.1 10 141   Kale 28 2 5.6 1.9 0.4 23 228   Brussel Sprouts 43 3.8 9 3.4 0.3 25 389   Spinach 23 2.4 3.8 3 0.3 70 466   Zucchini 15 1 2.7 1.1 0.4 3 264   Mushroom 28 2.2 5.3 2.2 0.5 2 356

## 2024-05-08 ENCOUNTER — OFFICE VISIT (OUTPATIENT)
Dept: BARIATRICS/WEIGHT MGMT | Age: 69
End: 2024-05-08
Payer: MEDICARE

## 2024-05-08 VITALS
TEMPERATURE: 98.7 F | BODY MASS INDEX: 41.1 KG/M2 | WEIGHT: 195.8 LBS | HEIGHT: 58 IN | HEART RATE: 68 BPM | SYSTOLIC BLOOD PRESSURE: 122 MMHG | DIASTOLIC BLOOD PRESSURE: 53 MMHG

## 2024-05-08 DIAGNOSIS — E66.01 MORBID OBESITY WITH BMI OF 40.0-44.9, ADULT (HCC): ICD-10-CM

## 2024-05-08 DIAGNOSIS — G47.33 OSA (OBSTRUCTIVE SLEEP APNEA): Primary | ICD-10-CM

## 2024-05-08 PROCEDURE — 1090F PRES/ABSN URINE INCON ASSESS: CPT | Performed by: CLINICAL NURSE SPECIALIST

## 2024-05-08 PROCEDURE — 1036F TOBACCO NON-USER: CPT | Performed by: CLINICAL NURSE SPECIALIST

## 2024-05-08 PROCEDURE — 1123F ACP DISCUSS/DSCN MKR DOCD: CPT | Performed by: CLINICAL NURSE SPECIALIST

## 2024-05-08 PROCEDURE — G8417 CALC BMI ABV UP PARAM F/U: HCPCS | Performed by: CLINICAL NURSE SPECIALIST

## 2024-05-08 PROCEDURE — 3017F COLORECTAL CA SCREEN DOC REV: CPT | Performed by: CLINICAL NURSE SPECIALIST

## 2024-05-08 PROCEDURE — 99211 OFF/OP EST MAY X REQ PHY/QHP: CPT

## 2024-05-08 PROCEDURE — 99213 OFFICE O/P EST LOW 20 MIN: CPT | Performed by: CLINICAL NURSE SPECIALIST

## 2024-05-08 PROCEDURE — G8427 DOCREV CUR MEDS BY ELIG CLIN: HCPCS | Performed by: CLINICAL NURSE SPECIALIST

## 2024-05-08 PROCEDURE — G8400 PT W/DXA NO RESULTS DOC: HCPCS | Performed by: CLINICAL NURSE SPECIALIST

## 2024-05-08 RX ORDER — PHENTERMINE HYDROCHLORIDE 37.5 MG/1
TABLET ORAL
Qty: 30 TABLET | Refills: 0 | Status: SHIPPED | OUTPATIENT
Start: 2024-05-08 | End: 2024-06-08

## 2024-05-08 ASSESSMENT — ENCOUNTER SYMPTOMS
VOMITING: 0
SHORTNESS OF BREATH: 0
NAUSEA: 0
CONSTIPATION: 1

## 2024-05-08 NOTE — PATIENT INSTRUCTIONS
Weight             Date              212 lbs             02/01/2024 Phentermine #1              206.6 lbs         03/04/2024 Phentermine #2              203.6 lbs         04/02/2024 Phentermine #3              195.8 lbs         05/08/2024 Phentermine #4     Total weight change to date: -16.2 lbs.      Patient's estimated daily energy need (DEN) = 1882 Derek/d      Average daily energy variance:  2/1/2024 - 3/4/2024: -5.4 lbs /32 d = -591 Derek/d deficit. (2.5% change in total body weight)  3/4/2024 - 4/2/2024: -3.0 lbs /29 d = -363 Derek/d deficit.  4/2/2024 - 5/8/2024: -7.8 lbs/36 d = -758 Derek/d deficit.  02/01/2024 - 05/08/2024 : 212 - 195.8 = -7.6% change in body wt in about 3 m (97 days).     Weight effect of high risk foods =  Fast food 525 Derek/week Sweets 2520 Derek + salty 885 Derek + SSB  1133 Derek= 5063 Derek/week=  723 Derek/day 38% DEN,= 72 lbs/year      Notes from previous visit     Doing well. Had some sweets for her birthday this month. Has not been consistently tracking.  She is going to increase to 1 tab given her blood pressure meets the parameters. Noticing the suppression is not as strong. We discussed increasing her water intake to help with her constipation.     Update:     Calorie Using lose it milton  tracking 50 %  Protein: 40 gms/day  Protein shake , protein bar  Fiber: fruit vegetables  fiber one cereal   Water: 30 oz   Micronutrients: MVI daily  Sweets:  3 d/week  popsicle at night  Non-sweet snacks: 0 d/week  SSB: Decreased sugar in ice tea to < 1/4 cup    Restaurant food: 7 x in the month taking niece to [facility]  Appetite suppressant: Phentermine  Blood pressure has been variable but never out of range /53 today Constipation using a stool softener 5-6/10 appetite suppression  taking 1 tab   Exercise: Walk the dog a few times a week, walking by herself as well      Medication management:     Phentermine     Assessment -  Improving.She currently is on 1 tab. of phentermine.Constipation remains an issue

## 2024-05-08 NOTE — PROGRESS NOTES
protein intake is at least 75 grams per day (do not count protein every day; instead spot check your intake every 2-3 weeks and make sure what you think you are getting is close to accurate; consider using a protein shake if needed; these are in the pharmacy section of the stores, not the grocery section; Premier, Pure Protein and Fairlife are relatively inexpensive and taste good to most patients; other options are Nectar, Boost Max, Ensure Max, BeneProtein and GNC lean (which is lactose-free);   Nectar fruit, Premier Protein Clear, IsoPure Protein Drink, and Protein 2 O are water-based options; Quest (or Cosco, which is cheaper and is ordered on Amazon) and the reeplay.it protein bars can also be used, but have less protein in them )    (Disclaimer: Dietary supplements rarely have their listed ingredients and the amount of each verified by a third party other. Sometimes they give verification for their claims to be GMO and gluten free and to be organic. However, even such verifications as these may still be untrustworthy.)    Make sure that fiber intake is at least 22 grams per day. Do this by either eating 12 tablespoons of the original, plain Fiber One cereal every day or 4 tablespoons of wheat dextrin powder (Benefiber or a generic brand) every day. Work up to this amount slowly by starting with only one-eighth to one-fourth of the target amount and then adding another one-eighth to one-fourth every one or two weeks until reaching the target.      Take one multivitamin every day    Targets:  Limit calorie intake to 1400 calories/day  Start swimming at the gym 2-3 times a week  Avoid eating 2 hours within bedtime.   64 oz of water     Tips:  Do not eat outside of the dining room or the kitchen  Do not eat while watching TV, videos, working on the computer or using a smart phone  Do not eat food out of a multi-serving bag or container.    Food Resources :     Low calorie non-starchy vegetables:  Food (per 100 g)

## 2024-06-04 ENCOUNTER — OFFICE VISIT (OUTPATIENT)
Dept: BARIATRICS/WEIGHT MGMT | Age: 69
End: 2024-06-04
Payer: MEDICARE

## 2024-06-04 VITALS
DIASTOLIC BLOOD PRESSURE: 49 MMHG | WEIGHT: 198 LBS | HEART RATE: 85 BPM | HEIGHT: 58 IN | SYSTOLIC BLOOD PRESSURE: 111 MMHG | TEMPERATURE: 97 F | BODY MASS INDEX: 41.56 KG/M2

## 2024-06-04 DIAGNOSIS — E66.01 MORBID OBESITY WITH BMI OF 40.0-44.9, ADULT (HCC): Primary | ICD-10-CM

## 2024-06-04 DIAGNOSIS — G47.33 OSA (OBSTRUCTIVE SLEEP APNEA): ICD-10-CM

## 2024-06-04 PROCEDURE — 1036F TOBACCO NON-USER: CPT | Performed by: CLINICAL NURSE SPECIALIST

## 2024-06-04 PROCEDURE — G8427 DOCREV CUR MEDS BY ELIG CLIN: HCPCS | Performed by: CLINICAL NURSE SPECIALIST

## 2024-06-04 PROCEDURE — 1123F ACP DISCUSS/DSCN MKR DOCD: CPT | Performed by: CLINICAL NURSE SPECIALIST

## 2024-06-04 PROCEDURE — G8417 CALC BMI ABV UP PARAM F/U: HCPCS | Performed by: CLINICAL NURSE SPECIALIST

## 2024-06-04 PROCEDURE — 1090F PRES/ABSN URINE INCON ASSESS: CPT | Performed by: CLINICAL NURSE SPECIALIST

## 2024-06-04 PROCEDURE — 3017F COLORECTAL CA SCREEN DOC REV: CPT | Performed by: CLINICAL NURSE SPECIALIST

## 2024-06-04 PROCEDURE — 99214 OFFICE O/P EST MOD 30 MIN: CPT | Performed by: CLINICAL NURSE SPECIALIST

## 2024-06-04 PROCEDURE — G8400 PT W/DXA NO RESULTS DOC: HCPCS | Performed by: CLINICAL NURSE SPECIALIST

## 2024-06-04 PROCEDURE — 99211 OFF/OP EST MAY X REQ PHY/QHP: CPT

## 2024-06-04 ASSESSMENT — ENCOUNTER SYMPTOMS
CONSTIPATION: 1
SHORTNESS OF BREATH: 0
NAUSEA: 0
VOMITING: 0

## 2024-06-04 NOTE — PROGRESS NOTES
Potassium (mg)   Green Bell Peppers 10 1.7 4.6 0.9 0.2 3 175   Cucumbers 15 0.5 3.6 0.7 0.1 2 147   Celery 16 1.6 3 0.7 0.2 80 260   Tomatoes 18 1.2 3.9 0.9 0.2 5 237   Carrots 41 2.8 10 0.9 0.2 69 320   Cabbage 25 2.5 6 1.3 0.1 18 170   Broccoli 35 3.3 7.2 2.4 0.4 41 293   Cauliflower 23 2.3 4.1 1.8 0.5 15 142   Iceberg Lettuce 14 1.2 3 0.9 0.1 10 141   Kale 28 2 5.6 1.9 0.4 23 228   Brussel Sprouts 43 3.8 9 3.4 0.3 25 389   Spinach 23 2.4 3.8 3 0.3 70 466   Zucchini 15 1 2.7 1.1 0.4 3 264   Mushroom 28 2.2 5.3 2.2 0.5 2 356   Asparagus 22 2 4.1 2.4 0.2 14 224   Green Beans 35 3.2 7.9 1.9 0.3 1 146   Eggplant 35 2.5 8.7 0.8 0.2 1 123     Protein options (20-30 grams protein): 1 cup of low fat cottage cheese (180 derek/20 grams protein), 6 egg whites + 1 whole egg (170 derek, 24 grams of protein), 3 oz of chicken (130 derek, 25 grams protein), low fat, high protein Greek yogurt (240 derek, 30 grams protein),  4 oz.Tempeh (193 Derek/ 18 grams protein). 1 cup tofu ( 182 Derek/ 20 grams protein). 1/2 cup seitan ( 169 Ca/ 30 grams protein)    Return to see me in 2 month    Total time spent on encounter 25 minutes     KEREN Lopez - CNS  Obesity  06/04/2024

## 2024-06-04 NOTE — PATIENT INSTRUCTIONS
lactose-free);   Nectar fruit, Premier Protein Clear, IsoPure Protein Drink, and Protein 2 O are water-based options; Quest (or Cosco, which is cheaper and is ordered on Amazon) and the [employer] 1 protein bars can also be used, but have less protein in them )    (Disclaimer: Dietary supplements rarely have their listed ingredients and the amount of each verified by a third party other. Sometimes they give verification for their claims to be GMO and gluten free and to be organic. However, even such verifications as these may still be untrustworthy.)    Make sure that fiber intake is at least 22 grams per day. Do this by either eating 12 tablespoons of the original, plain Fiber One cereal every day or 4 tablespoons of wheat dextrin powder (Benefiber or a generic brand) every day. Work up to this amount slowly by starting with only one-eighth to one-fourth of the target amount and then adding another one-eighth to one-fourth every one or two weeks until reaching the target.      Take one multivitamin every day    Targets:  Limit calorie intake to 1400 calories/day  Start swimming at the gym 2-3 times a week  Avoid eating 2 hours within bedtime.   64 oz of water     Tips:  Do not eat outside of the dining room or the kitchen  Do not eat while watching TV, videos, working on the computer or using a smart phone  Do not eat food out of a multi-serving bag or container.    Food Resources :     Low calorie non-starchy vegetables:  Food (per 100 g) Calories Fibers T. Carbohydrates Protein Fat Sodium (mg) Potassium (mg)   Green Bell Peppers 10 1.7 4.6 0.9 0.2 3 175   Cucumbers 15 0.5 3.6 0.7 0.1 2 147   Celery 16 1.6 3 0.7 0.2 80 260   Tomatoes 18 1.2 3.9 0.9 0.2 5 237   Carrots 41 2.8 10 0.9 0.2 69 320   Cabbage 25 2.5 6 1.3 0.1 18 170   Broccoli 35 3.3 7.2 2.4 0.4 41 293   Cauliflower 23 2.3 4.1 1.8 0.5 15 142   Iceberg Lettuce 14 1.2 3 0.9 0.1 10 141   Kale 28 2 5.6 1.9 0.4 23 228   Brussel Sprouts 43 3.8 9 3.4 0.3 25 389

## 2024-06-07 ENCOUNTER — TELEPHONE (OUTPATIENT)
Dept: BARIATRICS/WEIGHT MGMT | Age: 69
End: 2024-06-07

## (undated) DEVICE — PADDING,UNDERCAST,COTTON, 3X4YD STERILE: Brand: MEDLINE

## (undated) DEVICE — ZIMMER® STERILE DISPOSABLE TOURNIQUET CUFF WITH PROTECTIVE SLEEVE AND PLC, DUAL PORT, SINGLE BLADDER, 18 IN. (46 CM)

## (undated) DEVICE — GLOVE ORANGE PI 8 1/2   MSG9085

## (undated) DEVICE — UPPER EXTREMITY: Brand: MEDLINE INDUSTRIES, INC.

## (undated) DEVICE — DRAPE,HAND,STERILE: Brand: MEDLINE

## (undated) DEVICE — BNDG,ELSTC,MATRIX,STRL,3"X5YD,LF,HOOK&LP: Brand: MEDLINE

## (undated) DEVICE — GLOVE SURG SZ 85 L12IN FNGR ORTHO 126MIL CRM LTX FREE

## (undated) DEVICE — SCREW BNE L22MM DIA2.4MM CORT S STL ST T8 STARDRV RECESS
Type: IMPLANTABLE DEVICE | Site: ARM | Status: NON-FUNCTIONAL
Removed: 2022-10-11

## (undated) DEVICE — BIT DRL L110MM DIA1.8MM QUIK CPL CALIB W/O STP REUSE

## (undated) DEVICE — SCREW BNE L8MM DIA2.4MM S STL ST VAR ANG LOK FULL THRD T8
Type: IMPLANTABLE DEVICE | Site: ARM | Status: NON-FUNCTIONAL
Removed: 2022-10-11

## (undated) DEVICE — 4-PORT MANIFOLD: Brand: NEPTUNE 2

## (undated) DEVICE — BIT DRL L100MM DIA2MM ST QUIK CPL NONRADIOPAQUE W/O STP

## (undated) DEVICE — ELECTRODE PT RET AD L9FT HI MOIST COND ADH HYDRGEL CORDED

## (undated) DEVICE — PADDING,UNDERCAST,COTTON, 4"X4YD STERILE: Brand: MEDLINE